# Patient Record
Sex: FEMALE | Race: WHITE | HISPANIC OR LATINO | Employment: FULL TIME | URBAN - METROPOLITAN AREA
[De-identification: names, ages, dates, MRNs, and addresses within clinical notes are randomized per-mention and may not be internally consistent; named-entity substitution may affect disease eponyms.]

---
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
## 2019-05-14 ENCOUNTER — HOSPITAL ENCOUNTER (EMERGENCY)
Facility: HOSPITAL | Age: 19
Discharge: HOME | End: 2019-05-14
Attending: EMERGENCY MEDICINE | Admitting: EMERGENCY MEDICINE

## 2019-05-14 VITALS
HEART RATE: 70 BPM | DIASTOLIC BLOOD PRESSURE: 92 MMHG | OXYGEN SATURATION: 100 % | SYSTOLIC BLOOD PRESSURE: 135 MMHG | RESPIRATION RATE: 16 BRPM | WEIGHT: 229.28 LBS | HEIGHT: 68 IN | TEMPERATURE: 97.6 F | BODY MASS INDEX: 34.75 KG/M2

## 2019-05-14 DIAGNOSIS — R73.9 HYPERGLYCEMIA: ICD-10-CM

## 2019-05-14 DIAGNOSIS — R10.9 ABDOMINAL PAIN: Primary | ICD-10-CM

## 2019-05-14 LAB
ALBUMIN SERPL BCP-MCNC: 4.1 G/DL (ref 3.5–5)
ALP SERPL-CCNC: 173 U/L (ref 46–384)
ALT SERPL W P-5'-P-CCNC: 25 U/L (ref 12–78)
ANION GAP SERPL CALCULATED.3IONS-SCNC: 6 MMOL/L (ref 4–13)
AST SERPL W P-5'-P-CCNC: 9 U/L (ref 5–45)
BASOPHILS # BLD AUTO: 0.05 THOUSANDS/ΜL (ref 0–0.1)
BASOPHILS NFR BLD AUTO: 1 % (ref 0–1)
BILIRUB SERPL-MCNC: 0.5 MG/DL (ref 0.2–1)
BILIRUB UR QL STRIP: NEGATIVE
BUN SERPL-MCNC: 9 MG/DL (ref 5–25)
CALCIUM SERPL-MCNC: 9 MG/DL (ref 8.3–10.1)
CHLORIDE SERPL-SCNC: 104 MMOL/L (ref 100–108)
CLARITY UR: CLEAR
CO2 SERPL-SCNC: 25 MMOL/L (ref 21–32)
COLOR UR: YELLOW
COLOR, POC: ABNORMAL
CREAT SERPL-MCNC: 0.7 MG/DL (ref 0.6–1.3)
EOSINOPHIL # BLD AUTO: 0.15 THOUSAND/ΜL (ref 0–0.61)
EOSINOPHIL NFR BLD AUTO: 3 % (ref 0–6)
ERYTHROCYTE [DISTWIDTH] IN BLOOD BY AUTOMATED COUNT: 13.2 % (ref 11.6–15.1)
EXT PREG TEST URINE: NORMAL
GFR SERPL CREATININE-BSD FRML MDRD: 126 ML/MIN/1.73SQ M
GLUCOSE SERPL-MCNC: 388 MG/DL (ref 65–140)
GLUCOSE SERPL-MCNC: 395 MG/DL (ref 65–140)
GLUCOSE UR STRIP-MCNC: ABNORMAL MG/DL
HCT VFR BLD AUTO: 46.7 % (ref 34.8–46.1)
HGB BLD-MCNC: 15.3 G/DL (ref 11.5–15.4)
HGB UR QL STRIP.AUTO: NEGATIVE
IMM GRANULOCYTES # BLD AUTO: 0.01 THOUSAND/UL (ref 0–0.2)
IMM GRANULOCYTES NFR BLD AUTO: 0 % (ref 0–2)
KETONES UR STRIP-MCNC: ABNORMAL MG/DL
LEUKOCYTE ESTERASE UR QL STRIP: NEGATIVE
LIPASE SERPL-CCNC: 114 U/L (ref 73–393)
LYMPHOCYTES # BLD AUTO: 1.36 THOUSANDS/ΜL (ref 0.6–4.47)
LYMPHOCYTES NFR BLD AUTO: 23 % (ref 14–44)
MCH RBC QN AUTO: 27.9 PG (ref 26.8–34.3)
MCHC RBC AUTO-ENTMCNC: 32.8 G/DL (ref 31.4–37.4)
MCV RBC AUTO: 85 FL (ref 82–98)
MONOCYTES # BLD AUTO: 0.51 THOUSAND/ΜL (ref 0.17–1.22)
MONOCYTES NFR BLD AUTO: 9 % (ref 4–12)
NEUTROPHILS # BLD AUTO: 3.89 THOUSANDS/ΜL (ref 1.85–7.62)
NEUTS SEG NFR BLD AUTO: 64 % (ref 43–75)
NITRITE UR QL STRIP: NEGATIVE
NRBC BLD AUTO-RTO: 0 /100 WBCS
PH UR STRIP.AUTO: 7 [PH] (ref 4.5–8)
PLATELET # BLD AUTO: 263 THOUSANDS/UL (ref 149–390)
PMV BLD AUTO: 11 FL (ref 8.9–12.7)
POTASSIUM SERPL-SCNC: 3.9 MMOL/L (ref 3.5–5.3)
PROT SERPL-MCNC: 8.1 G/DL (ref 6.4–8.2)
PROT UR STRIP-MCNC: NEGATIVE MG/DL
RBC # BLD AUTO: 5.48 MILLION/UL (ref 3.81–5.12)
SODIUM SERPL-SCNC: 135 MMOL/L (ref 136–145)
SP GR UR STRIP.AUTO: 1.01 (ref 1–1.03)
UROBILINOGEN UR QL STRIP.AUTO: 0.2 E.U./DL
WBC # BLD AUTO: 5.97 THOUSAND/UL (ref 4.31–10.16)

## 2019-05-14 PROCEDURE — 96374 THER/PROPH/DIAG INJ IV PUSH: CPT

## 2019-05-14 PROCEDURE — 81025 URINE PREGNANCY TEST: CPT

## 2019-05-14 PROCEDURE — 36415 COLL VENOUS BLD VENIPUNCTURE: CPT | Performed by: EMERGENCY MEDICINE

## 2019-05-14 PROCEDURE — 96375 TX/PRO/DX INJ NEW DRUG ADDON: CPT

## 2019-05-14 PROCEDURE — 82948 REAGENT STRIP/BLOOD GLUCOSE: CPT

## 2019-05-14 PROCEDURE — 99284 EMERGENCY DEPT VISIT MOD MDM: CPT | Performed by: EMERGENCY MEDICINE

## 2019-05-14 PROCEDURE — 80053 COMPREHEN METABOLIC PANEL: CPT | Performed by: EMERGENCY MEDICINE

## 2019-05-14 PROCEDURE — 99284 EMERGENCY DEPT VISIT MOD MDM: CPT

## 2019-05-14 PROCEDURE — 96361 HYDRATE IV INFUSION ADD-ON: CPT

## 2019-05-14 PROCEDURE — 83690 ASSAY OF LIPASE: CPT | Performed by: EMERGENCY MEDICINE

## 2019-05-14 PROCEDURE — 85025 COMPLETE CBC W/AUTO DIFF WBC: CPT | Performed by: EMERGENCY MEDICINE

## 2019-05-14 PROCEDURE — 81003 URINALYSIS AUTO W/O SCOPE: CPT

## 2019-05-14 RX ORDER — DICYCLOMINE HCL 20 MG
20 TABLET ORAL ONCE
Status: COMPLETED | OUTPATIENT
Start: 2019-05-14 | End: 2019-05-14

## 2019-05-14 RX ORDER — ONDANSETRON 2 MG/ML
4 INJECTION INTRAMUSCULAR; INTRAVENOUS ONCE
Status: COMPLETED | OUTPATIENT
Start: 2019-05-14 | End: 2019-05-14

## 2019-05-14 RX ORDER — KETOROLAC TROMETHAMINE 30 MG/ML
15 INJECTION, SOLUTION INTRAMUSCULAR; INTRAVENOUS ONCE
Status: COMPLETED | OUTPATIENT
Start: 2019-05-14 | End: 2019-05-14

## 2019-05-14 RX ORDER — MAGNESIUM HYDROXIDE/ALUMINUM HYDROXICE/SIMETHICONE 120; 1200; 1200 MG/30ML; MG/30ML; MG/30ML
30 SUSPENSION ORAL ONCE
Status: COMPLETED | OUTPATIENT
Start: 2019-05-14 | End: 2019-05-14

## 2019-05-14 RX ORDER — FAMOTIDINE 20 MG/1
20 TABLET, FILM COATED ORAL 2 TIMES DAILY
Qty: 60 TABLET | Refills: 0 | Status: SHIPPED | OUTPATIENT
Start: 2019-05-14 | End: 2020-08-18 | Stop reason: ALTCHOICE

## 2019-05-14 RX ADMIN — FAMOTIDINE 20 MG: 10 INJECTION, SOLUTION INTRAVENOUS at 15:49

## 2019-05-14 RX ADMIN — ONDANSETRON 4 MG: 2 INJECTION INTRAMUSCULAR; INTRAVENOUS at 15:49

## 2019-05-14 RX ADMIN — SODIUM CHLORIDE 2000 ML: 0.9 INJECTION, SOLUTION INTRAVENOUS at 17:01

## 2019-05-14 RX ADMIN — ALUMINUM HYDROXIDE, MAGNESIUM HYDROXIDE, AND SIMETHICONE 30 ML: 200; 200; 20 SUSPENSION ORAL at 15:49

## 2019-05-14 RX ADMIN — DICYCLOMINE HYDROCHLORIDE 20 MG: 20 TABLET ORAL at 17:42

## 2019-05-14 RX ADMIN — KETOROLAC TROMETHAMINE 15 MG: 30 INJECTION, SOLUTION INTRAMUSCULAR at 17:42

## 2019-05-14 RX ADMIN — LIDOCAINE HYDROCHLORIDE 15 ML: 20 SOLUTION ORAL; TOPICAL at 15:49

## 2020-01-06 ENCOUNTER — HOSPITAL ENCOUNTER (EMERGENCY)
Facility: HOSPITAL | Age: 20
Discharge: HOME | End: 2020-01-06
Attending: EMERGENCY MEDICINE

## 2020-01-06 VITALS
HEART RATE: 92 BPM | RESPIRATION RATE: 18 BRPM | OXYGEN SATURATION: 97 % | SYSTOLIC BLOOD PRESSURE: 130 MMHG | DIASTOLIC BLOOD PRESSURE: 79 MMHG | TEMPERATURE: 97 F

## 2020-01-06 DIAGNOSIS — J40 BRONCHITIS: Primary | ICD-10-CM

## 2020-01-06 PROCEDURE — 99282 EMERGENCY DEPT VISIT SF MDM: CPT

## 2020-01-06 PROCEDURE — 99284 EMERGENCY DEPT VISIT MOD MDM: CPT | Performed by: PHYSICIAN ASSISTANT

## 2020-01-06 RX ORDER — AZITHROMYCIN 250 MG/1
TABLET, FILM COATED ORAL
Qty: 6 TABLET | Refills: 0 | Status: SHIPPED | OUTPATIENT
Start: 2020-01-06 | End: 2020-01-10

## 2020-01-06 NOTE — ED PROVIDER NOTES
History  Chief Complaint   Patient presents with    Nasal Congestion     Congestion, runny nose since last week, dry cough  60-year-old female presents to the emergency department with complaints of nasal congestion with a productive cough over the past 1-2 weeks  Questionable fevers at home  States she has had intermittent sweating as well as chills at night  Also states that her boyfriend has been sick with similar symptoms  States that cough has been productive with some green and yellow mucus  States that it is been scattered throughout the day  Does not seem to be worse in the morning or at night  Denies difficulty breathing or shortness of breath  No history of asthma  History provided by:  Patient   used: No        Prior to Admission Medications   Prescriptions Last Dose Informant Patient Reported? Taking?   famotidine (PEPCID) 20 mg tablet   No No   Sig: Take 1 tablet (20 mg total) by mouth 2 (two) times a day for 30 days      Facility-Administered Medications: None       Past Medical History:   Diagnosis Date    Diabetes mellitus ([facility] [address] )        History reviewed  No pertinent surgical history  History reviewed  No pertinent family history  I have reviewed and agree with the history as documented  Social History     Tobacco Use    Smoking status: Current Every Day Smoker     Packs/day: 0 20    Smokeless tobacco: Never Used   Substance Use Topics    Alcohol use: No    Drug use: No        Review of Systems   Constitutional: Positive for chills  Negative for activity change and fever  HENT: Positive for congestion  Negative for dental problem, drooling, ear pain, mouth sores, sinus pressure, sneezing, sore throat, tinnitus and trouble swallowing  Eyes: Negative for pain, discharge and itching  Respiratory: Positive for cough  Negative for chest tightness, shortness of breath and wheezing  Cardiovascular: Negative for chest pain     Skin: Negative for rash    Neurological: Negative for dizziness, light-headedness and headaches  All other systems reviewed and are negative  Physical Exam  Physical Exam   Constitutional: She is oriented to person, place, and time  Vital signs are normal  She appears well-developed and well-nourished  No distress  HENT:   Head: Normocephalic and atraumatic  Right Ear: Hearing, tympanic membrane, external ear and ear canal normal    Left Ear: Hearing, tympanic membrane, external ear and ear canal normal    Nose: Nose normal    Mouth/Throat: Uvula is midline and oropharynx is clear and moist  No oropharyngeal exudate  Eyes: Conjunctivae, EOM and lids are normal    Cardiovascular: Normal rate and regular rhythm  Exam reveals no gallop and no friction rub  No murmur heard  Pulmonary/Chest: Effort normal and breath sounds normal  No respiratory distress  She has no wheezes  She has no rhonchi  She has no rales  She exhibits no tenderness  Musculoskeletal: Normal range of motion  Neurological: She is alert and oriented to person, place, and time  Skin: She is not diaphoretic  Psychiatric: She has a normal mood and affect  Her behavior is normal    Vitals reviewed        Vital Signs  ED Triage Vitals [01/06/20 1117]   Temperature Pulse Respirations Blood Pressure SpO2   (!) 97 °F (36 1 °C) 92 18 130/79 97 %      Temp Source Heart Rate Source Patient Position - Orthostatic VS BP Location FiO2 (%)   Oral Monitor Sitting Right arm --      Pain Score       7           Vitals:    01/06/20 1117   BP: 130/79   Pulse: 92   Patient Position - Orthostatic VS: Sitting         Visual Acuity      ED Medications  Medications - No data to display    Diagnostic Studies  Results Reviewed     None                 No orders to display              Procedures  Procedures         ED Course                               MDM  Number of Diagnoses or Management Options  Bronchitis:   Diagnosis management comments: Differential diagnosis includes but not limited to:  Upper respiratory infection, bronchitis, pneumonia          Disposition  Final diagnoses:   Bronchitis     Time reflects when diagnosis was documented in both MDM as applicable and the Disposition within this note     Time User Action Codes Description Comment    1/6/2020 11:34 AM Sol Collins Add [J21 9] Bronchiolitis     1/6/2020 11:34 AM Sol Collins Add [J40] Bronchitis     1/6/2020 11:34 AM Sol Collins Modify [J40] Bronchitis     1/6/2020 11:34 AM Sol Collins Remove [J21 9] Bronchiolitis       ED Disposition     ED Disposition Condition Date/Time Comment    Discharge Stable Mon Jan 6, 2020 11:34 AM Channing Sierra discharge to home/self care  Follow-up Information    None         Discharge Medication List as of 1/6/2020 11:36 AM      START taking these medications    Details   azithromycin (ZITHROMAX) 250 mg tablet Take 2 tablets today then 1 tablet daily x 4 days, Normal         CONTINUE these medications which have NOT CHANGED    Details   famotidine (PEPCID) 20 mg tablet Take 1 tablet (20 mg total) by mouth 2 (two) times a day for 30 days, Starting Tue 5/14/2019, Until Thu 6/13/2019, Print           No discharge procedures on file      ED Provider  Electronically Signed by           Duc Rousseau PA-C  01/06/20 6504

## 2020-08-18 ENCOUNTER — HOSPITAL ENCOUNTER (EMERGENCY)
Facility: HOSPITAL | Age: 20
Discharge: HOME | End: 2020-08-18
Attending: EMERGENCY MEDICINE | Admitting: EMERGENCY MEDICINE
Payer: COMMERCIAL

## 2020-08-18 VITALS
OXYGEN SATURATION: 96 % | RESPIRATION RATE: 18 BRPM | SYSTOLIC BLOOD PRESSURE: 167 MMHG | HEART RATE: 83 BPM | WEIGHT: 223.33 LBS | BODY MASS INDEX: 33.96 KG/M2 | DIASTOLIC BLOOD PRESSURE: 81 MMHG

## 2020-08-18 DIAGNOSIS — R10.2 PELVIC PAIN: Primary | ICD-10-CM

## 2020-08-18 DIAGNOSIS — N94.6 DYSMENORRHEA: ICD-10-CM

## 2020-08-18 DIAGNOSIS — E11.65 HYPERGLYCEMIA DUE TO TYPE 2 DIABETES MELLITUS (HCC): ICD-10-CM

## 2020-08-18 LAB
BACTERIA UR QL AUTO: ABNORMAL /HPF
BILIRUB UR QL STRIP: NEGATIVE
CLARITY UR: CLEAR
COLOR UR: YELLOW
EXT PREG TEST URINE: NEGATIVE
EXT. CONTROL ED NAV: NORMAL
GLUCOSE SERPL-MCNC: 317 MG/DL (ref 65–140)
GLUCOSE UR STRIP-MCNC: ABNORMAL MG/DL
HGB UR QL STRIP.AUTO: NEGATIVE
KETONES UR STRIP-MCNC: NEGATIVE MG/DL
LEUKOCYTE ESTERASE UR QL STRIP: ABNORMAL
NITRITE UR QL STRIP: NEGATIVE
NON-SQ EPI CELLS URNS QL MICRO: ABNORMAL /HPF
PH UR STRIP.AUTO: 7 [PH] (ref 4.5–8)
PROT UR STRIP-MCNC: NEGATIVE MG/DL
RBC #/AREA URNS AUTO: ABNORMAL /HPF
SP GR UR STRIP.AUTO: 1.02 (ref 1–1.03)
UROBILINOGEN UR QL STRIP.AUTO: 0.2 E.U./DL
WBC #/AREA URNS AUTO: ABNORMAL /HPF

## 2020-08-18 PROCEDURE — 82948 REAGENT STRIP/BLOOD GLUCOSE: CPT

## 2020-08-18 PROCEDURE — 81001 URINALYSIS AUTO W/SCOPE: CPT

## 2020-08-18 PROCEDURE — 99284 EMERGENCY DEPT VISIT MOD MDM: CPT | Performed by: EMERGENCY MEDICINE

## 2020-08-18 PROCEDURE — 99284 EMERGENCY DEPT VISIT MOD MDM: CPT

## 2020-08-18 PROCEDURE — 81025 URINE PREGNANCY TEST: CPT | Performed by: EMERGENCY MEDICINE

## 2020-08-18 RX ORDER — NAPROXEN 500 MG/1
500 TABLET ORAL 2 TIMES DAILY WITH MEALS
Qty: 30 TABLET | Refills: 0 | Status: SHIPPED | OUTPATIENT
Start: 2020-08-18

## 2020-08-18 RX ADMIN — METFORMIN HYDROCHLORIDE 500 MG: 500 TABLET, FILM COATED ORAL at 22:44

## 2020-08-19 NOTE — DISCHARGE INSTRUCTIONS
Your sugar was elevated, please resume taking your Metformin, if you have nausea/vomiting, inability to eat/drink, breathing fast or high fever return to the ER  Otherwise follow up with Family Medicine, Endocrinology and Gyn

## 2020-08-19 NOTE — ED PROVIDER NOTES
History  Chief Complaint   Patient presents with    Abdominal Pain     Pt here c/o adominal cramping since 0630 this morning  Pt states she took motrin at 0630 with no relief  Pt has a hx of PCOS that has been untreated for a while  22 yo female with h/o NIDDM and PCOS presents with intermittent pelvic cramping x 1 day  Reports that she is currently menstruating  Denies f/c/n/v/d  Pain at time of eval resolved  Pt requesting help with establishing care with Gyn  Reports that she has been unable to find a doctors office that will accept her insurance  Also reports that she is out of DM medication, metformin  Denies h/o DKA  Denies vaginal or urinary symptoms  History provided by:  Medical records and patient   used: No    Pelvic Pain   Location:  Pelvis  Quality:  Cramping  Severity:  Moderate  Duration:  1 day  Timing:  Intermittent  Progression:  Improving  Chronicity:  New  Context:  Currently menstruating  Relieved by:  Nothing  Worsened by:  Nothing  Ineffective treatments:  Midol  Associated symptoms: no fever and no vomiting    Risk factors:  PCOS      None       Past Medical History:   Diagnosis Date    Diabetes mellitus ([facility] [address] )     PCOS (polycystic ovarian syndrome)        History reviewed  No pertinent surgical history  History reviewed  No pertinent family history  I have reviewed and agree with the history as documented  E-Cigarette/Vaping    E-Cigarette Use Current Some Day User      E-Cigarette/Vaping Substances    Nicotine Yes      Social History     Tobacco Use    Smoking status: Never Smoker    Smokeless tobacco: Never Used   Substance Use Topics    Alcohol use: Yes     Comment: socially    Drug use: No       Review of Systems   Constitutional: Negative for chills and fever  Gastrointestinal: Negative for vomiting  Genitourinary: Positive for pelvic pain and vaginal bleeding  Negative for dysuria, flank pain, frequency and vaginal discharge  All other systems reviewed and are negative  Physical Exam  Physical Exam  Vitals signs and nursing note reviewed  Exam conducted with a chaperone present  Constitutional:       Appearance: She is well-developed  She is not diaphoretic  HENT:      Head: Normocephalic and atraumatic  Eyes:      Conjunctiva/sclera: Conjunctivae normal    Neck:      Musculoskeletal: Normal range of motion  Cardiovascular:      Rate and Rhythm: Normal rate and regular rhythm  Heart sounds: Normal heart sounds  No murmur  Pulmonary:      Effort: Pulmonary effort is normal  No respiratory distress  Breath sounds: Normal breath sounds  Abdominal:      General: Bowel sounds are normal       Palpations: Abdomen is soft  Tenderness: There is no abdominal tenderness  Genitourinary:     General: Normal vulva  Vagina: Normal       Cervix: Cervical bleeding present  No cervical motion tenderness  Uterus: Normal  Not tender  Adnexa: Right adnexa normal and left adnexa normal         Right: No tenderness  Left: No tenderness  Comments: Pt currently mensutrating   Musculoskeletal: Normal range of motion  General: No deformity  Skin:     General: Skin is warm and dry  Neurological:      Mental Status: She is alert and oriented to person, place, and time  Psychiatric:         Behavior: Behavior normal          Thought Content:  Thought content normal          Judgment: Judgment normal          Vital Signs  ED Triage Vitals [08/18/20 2120]   Temp Pulse Respirations Blood Pressure SpO2   -- 83 18 167/81 96 %      Temp src Heart Rate Source Patient Position - Orthostatic VS BP Location FiO2 (%)   -- Monitor Sitting Right arm --      Pain Score       8           Vitals:    08/18/20 2120   BP: 167/81   Pulse: 83   Patient Position - Orthostatic VS: Sitting         Visual Acuity      ED Medications  Medications   metFORMIN (GLUCOPHAGE) tablet 500 mg (500 mg Oral Given 8/18/20 2244)       Diagnostic Studies  Results Reviewed     Procedure Component Value Units Date/Time    Fingerstick Glucose (POCT) [[id number]]  (Abnormal) Collected:  08/18/20 2243    Lab Status:  Final result Updated:  08/18/20 2245     POC Glucose 317 mg/dl     Urine Microscopic [[id number]]  (Abnormal) Collected:  08/18/20 2245    Lab Status:  Final result Specimen:  Urine, Clean Catch Updated:  08/18/20 2242     RBC, UA None Seen /hpf      WBC, UA 0-1 /hpf      Epithelial Cells None Seen /hpf      Bacteria, UA None Seen /hpf     Urine Macroscopic, POC [[id number]]  (Abnormal) Collected:  08/18/20 2245    Lab Status:  Final result Specimen:  Urine Updated:  08/18/20 2228     Color, UA Yellow     Clarity, UA Clear     pH, UA 7 0     Leukocytes, UA Elevated glucose may cause decreased leukocyte values  See urine microscopic for [facility] result/     Nitrite, UA Negative     Protein, UA Negative mg/dl      Glucose, UA >=1000 (1%) mg/dl      Ketones, UA Negative mg/dl      Urobilinogen, UA 0 2 E U /dl      Bilirubin, UA Negative     Blood, UA Negative     Specific Gravity, UA 1 020    Narrative:       CLINITEK RESULT    POCT pregnancy, urine [[id number]]  (Normal) Resulted:  08/18/20 2222    Lab Status:  Final result Updated:  08/18/20 2222     EXT PREG TEST UR (Ref: Negative) negative     Control valid                 No orders to display              Procedures  Procedures         ED Course  ED Course as of Aug 19 0004   Tue Aug 18, 2020   2225 Negative  PREGNANCY TEST URINE: negative   2252 Pt well appearing, well hydrated, no signs of infection in urine or pelvic exam, belly benign, known diabetic non-compliant with meds 2/2 insurance reasons  Re-prescribed metformin and referred to appropriate specialists      Ketones, UA: Negative                                             MDM  Number of Diagnoses or Management Options  Dysmenorrhea:   Hyperglycemia due to type 2 diabetes mellitus [facility]):   Pelvic pain:   Diagnosis management comments: Pt with reassuring exam, well appearing, no significant TTP or CMT  Pt mostly interested in f/u information  Pt also with glucose in urine, no ketones or other symptoms to suggest DKA  Elevated BS secondary to non-compliance  Pt medications refilled and referred to endocrinology, pcp, and gyn  Amount and/or Complexity of Data Reviewed  Clinical lab tests: ordered and reviewed    Risk of Complications, Morbidity, and/or Mortality  Presenting problems: low  Diagnostic procedures: low  Management options: low          Disposition  Final diagnoses:   Pelvic pain   Dysmenorrhea   Hyperglycemia due to type 2 diabetes mellitus ([facility] [address] )     Time reflects when diagnosis was documented in both MDM as applicable and the Disposition within this note     Time User Action Codes Description Comment    8/18/2020 10:13 PM Mulflur, Sunita Add [R10 2] Pelvic pain     8/18/2020 10:14 PM Mulflur, Verlena Dial Add [N94 6] Dysmenorrhea     8/18/2020 10:31 PM Mulflur, Verlena Dial Add [E11 65] Hyperglycemia due to type 2 diabetes mellitus [facility])       ED Disposition     ED Disposition Condition Date/Time Comment    Discharge Stable Tue Aug 18, 2020 10:14 PM Kimi Santiago discharge to home/self care              Follow-up Information     Follow up With Specialties Details Why Contact Info Additional [facility] Family Medicine Schedule an appointment as soon as possible for a visit   [address] [phone]  [address]  , [place], [place], [place], [address]    [address] [facility] Obstetrics and Gynecology   [address] [facility] [address]  [address] [place] [address] [facility], [address] [place] [address] Obstetrics and Gynecology Schedule an appointment as soon as possible for a visit   [address] [facility] [place] [address]  [phone] [facility], [address] [place], [place], [address] Silvana Aggarwal    [address] [place] [facility] [place] [facility] Schedule an appointment as soon as possible for a visit   [address] [facility] [address] [phone] [address] [facility] [place], [address] [place], [place], [address] [facility]          Discharge Medication List as of 8/18/2020 10:57 PM      START taking these medications    Details   metFORMIN (GLUCOPHAGE) 500 mg tablet Take 1 tablet (500 mg total) by mouth 2 (two) times a day with meals, Starting Tue 8/18/2020, Normal      naproxen (NAPROSYN) 500 mg tablet Take 1 tablet (500 mg total) by mouth 2 (two) times a day with meals, Starting Tue 8/18/2020, Normal           No discharge procedures on file      PDMP Review     None          ED Provider  Electronically Signed by           Kaley Alejandra MD  08/19/20 0010

## 2020-09-08 ENCOUNTER — TELEPHONE (OUTPATIENT)
Dept: OBGYN CLINIC | Facility: CLINIC | Age: 20
End: 2020-09-08

## 2020-09-09 NOTE — TELEPHONE ENCOUNTER
Called number but pt is not available on the number  LM recommended pt contact the office to schedule apt

## 2022-02-04 ENCOUNTER — APPOINTMENT (OUTPATIENT)
Dept: LAB | Facility: CLINIC | Age: 22
End: 2022-02-04
Payer: COMMERCIAL

## 2022-02-04 DIAGNOSIS — E11.00 TYPE 2 DIABETES MELLITUS WITH HYPEROSMOLARITY WITHOUT COMA, UNSPECIFIED WHETHER LONG TERM INSULIN USE (HCC): ICD-10-CM

## 2022-02-04 LAB
ALBUMIN SERPL BCP-MCNC: 3.6 G/DL (ref 3.5–5)
ALP SERPL-CCNC: 85 U/L (ref 46–116)
ALT SERPL W P-5'-P-CCNC: 23 U/L (ref 12–78)
ANION GAP SERPL CALCULATED.3IONS-SCNC: 8 MMOL/L (ref 4–13)
AST SERPL W P-5'-P-CCNC: 12 U/L (ref 5–45)
BILIRUB SERPL-MCNC: 0.43 MG/DL (ref 0.2–1)
BUN SERPL-MCNC: 10 MG/DL (ref 5–25)
CALCIUM SERPL-MCNC: 9.4 MG/DL (ref 8.3–10.1)
CHLORIDE SERPL-SCNC: 101 MMOL/L (ref 100–108)
CHOLEST SERPL-MCNC: 147 MG/DL
CO2 SERPL-SCNC: 28 MMOL/L (ref 21–32)
CREAT SERPL-MCNC: 0.67 MG/DL (ref 0.6–1.3)
EST. AVERAGE GLUCOSE BLD GHB EST-MCNC: 280 MG/DL
GFR SERPL CREATININE-BSD FRML MDRD: 125 ML/MIN/1.73SQ M
GLUCOSE P FAST SERPL-MCNC: 229 MG/DL (ref 65–99)
HBA1C MFR BLD: 11.4 %
HDLC SERPL-MCNC: 55 MG/DL
LDLC SERPL CALC-MCNC: 58 MG/DL (ref 0–100)
NONHDLC SERPL-MCNC: 92 MG/DL
POTASSIUM SERPL-SCNC: 4.4 MMOL/L (ref 3.5–5.3)
PROT SERPL-MCNC: 7.7 G/DL (ref 6.4–8.2)
SODIUM SERPL-SCNC: 137 MMOL/L (ref 136–145)
TRIGL SERPL-MCNC: 169 MG/DL

## 2022-02-04 PROCEDURE — 84681 ASSAY OF C-PEPTIDE: CPT

## 2022-02-04 PROCEDURE — 36415 COLL VENOUS BLD VENIPUNCTURE: CPT

## 2022-02-04 PROCEDURE — 80061 LIPID PANEL: CPT

## 2022-02-04 PROCEDURE — 80053 COMPREHEN METABOLIC PANEL: CPT

## 2022-02-04 PROCEDURE — 83036 HEMOGLOBIN GLYCOSYLATED A1C: CPT

## 2022-02-05 LAB — C PEPTIDE SERPL-MCNC: 2.9 NG/ML (ref 1.1–4.4)

## 2023-01-05 ENCOUNTER — OFFICE VISIT (OUTPATIENT)
Dept: INTERNAL MEDICINE CLINIC | Facility: CLINIC | Age: 23
End: 2023-01-05

## 2023-01-05 VITALS
BODY MASS INDEX: 33.92 KG/M2 | HEIGHT: 68 IN | WEIGHT: 223.8 LBS | HEART RATE: 108 BPM | TEMPERATURE: 98.6 F | OXYGEN SATURATION: 97 % | SYSTOLIC BLOOD PRESSURE: 138 MMHG | DIASTOLIC BLOOD PRESSURE: 80 MMHG

## 2023-01-05 DIAGNOSIS — E11.65 TYPE 2 DIABETES MELLITUS WITH HYPERGLYCEMIA, WITHOUT LONG-TERM CURRENT USE OF INSULIN (HCC): Primary | ICD-10-CM

## 2023-01-05 DIAGNOSIS — F41.1 GAD (GENERALIZED ANXIETY DISORDER): ICD-10-CM

## 2023-01-05 DIAGNOSIS — E28.2 PCOS (POLYCYSTIC OVARIAN SYNDROME): ICD-10-CM

## 2023-01-05 LAB — SL AMB POCT HEMOGLOBIN AIC: 14.9 (ref ?–6.5)

## 2023-01-05 RX ORDER — DESOGESTREL AND ETHINYL ESTRADIOL 0.15-0.03
1 KIT ORAL DAILY
COMMUNITY
Start: 2022-11-18

## 2023-01-05 RX ORDER — FLASH GLUCOSE SENSOR
1 KIT MISCELLANEOUS 3 TIMES DAILY
Qty: 2 EACH | Refills: 3 | Status: SHIPPED | OUTPATIENT
Start: 2023-01-05

## 2023-01-05 RX ORDER — UBIQUINOL 100 MG
CAPSULE ORAL 2 TIMES DAILY
Qty: 90 EACH | Refills: 0 | Status: SHIPPED | OUTPATIENT
Start: 2023-01-05

## 2023-01-05 RX ORDER — FLASH GLUCOSE SCANNING READER
EACH MISCELLANEOUS
Qty: 1 EACH | Refills: 0 | Status: SHIPPED | OUTPATIENT
Start: 2023-01-05

## 2023-01-05 RX ORDER — DESOGESTREL AND ETHINYL ESTRADIOL 0.15-0.03
1 KIT ORAL DAILY
COMMUNITY
Start: 2017-09-23

## 2023-01-05 RX ORDER — CITALOPRAM 10 MG/1
10 TABLET ORAL DAILY
Qty: 30 TABLET | Refills: 0 | Status: SHIPPED | OUTPATIENT
Start: 2023-01-05 | End: 2023-02-04

## 2023-01-05 RX ORDER — UBIQUINOL 100 MG
CAPSULE ORAL 2 TIMES DAILY
Qty: 90 EACH | Refills: 0 | Status: SHIPPED | OUTPATIENT
Start: 2023-01-05 | End: 2023-01-05

## 2023-01-05 NOTE — ASSESSMENT & PLAN NOTE
Patient states that she was diagnosed with PCOS at the age of 14 and was intially managed by an Endocrinologist   Denies any overt Hirsutism   She is currently on [place]   However she reports irregular menses if she misses one dose/day  Repeat labs-LH and [facility]   [facility] referral

## 2023-01-05 NOTE — ASSESSMENT & PLAN NOTE
Patient has a long history of Anxiety and depression though has never seek medical treatment regarding this   Will start patient on Celexa 10 mg daily

## 2023-01-05 NOTE — PROGRESS NOTES
Name: Joel Ferguson      : 2000      MRN: [id number]  Encounter Provider: German Martinez MD  Encounter Date: 2023   Encounter department: [address] [place] [address]  Type 2 diabetes mellitus with hyperglycemia, without long-term current use of insulin [facility])  Assessment & Plan:    Lab Results   Component Value Date    HGBA1C 14 9 (A) 2023   Patient states that she was diagnosed with T2DM at the age of 14  She was initally managed by an Endorcrinologist  She currently takes Metformin 500mg BID, however she states that she stopped taking her medication for awhile  A1c on  was 11 2 hemogolobin today in office is 14 9  We discussed that insulin is needed at this time in the management of her DM  Patient states that she does not want to be on in[place]lin  Patient was in agreement to starting [address]  mg BID  Free style Miguelina   alchol swabs   Microalbumin/creatinine ratio   Follow up in 3 months     Orders:  -     TSH, 3rd generation; Future  -     Continuous Blood Gluc Sensor (FreeStyle Miguelina 14 Day Sensor) MISC; Use 1 application 3 (three) times a day  -     sitaGLIPtin-metFORMIN (JANUMET)  MG per tablet; Take 1 tablet by mouth 2 (two) times a day with meals  -     Continuous Blood Gluc  (FreeStyle Miguelina 14 Day [place]) KAYLYNN; Dispense one reader  -     POCT hemoglobin A1c  -     CBC and differential; Future  -     LDL cholesterol, direct; Future  -     Microalbumin / creatinine urine ratio  -     TSH, 3rd generation  -     CBC and differential  -     LDL cholesterol, direct  -     Alcohol Swabs (Alcohol Prep) 70 % PADS; Use 2 (two) times a day    2   NÉSTOR (generalized anxiety disorder)  Assessment & Plan:  Patient has a long history of Anxiety and depression though has never seek medical treatment regarding this   Will start patient on Celexa 10 mg daily      Orders:  -     citalopram (CeleXA) 10 mg tablet; Take 1 tablet (10 mg total) by mouth daily    3  PCOS (polycystic ovarian syndrome)  Assessment & Plan:  Patient states that she was diagnosed with PCOS at the age of 14 and was intially managed by an Endocrinologist   Denies any overt Hirsutism   She is currently on [place]  However she reports irregular menses if she misses one dose/day  Repeat labs-LH and [facility]   OBGYN referral     Orders:  -     [facility] and ; Future  -     Ambulatory Referral to Obstetrics / Gynecology; Future  -     FSH and LH         Subjective      HPI   20year old female with a history of uncontrolled T2DM and PCOS here to establish care  Patient states that she was Diagnosed with DM at the age of 14 and PCOS at the age of 14  Patient state that she was being managed by an endocrinologist however at the age of 15 she was no longer seeing one  Due to her work schedule, she unfortunately would take her metformin (dosed BID) only a few hours apart instead of am and Pm  which caused her significant nausea and vomiting  She states that she has not been taking her metformin for awhile  She does complaining of irregular menses regarding birth control for her PCOS  She denies any overt Hirsutism  She complains of longer on/off johnson with anxiety/depression  She denies anf SOB,chest pain, diarrhea/cosntipation fever or chills   Review of Systems   Constitutional: Negative for chills and fever  HENT: Negative for ear pain and sore throat  Eyes: Negative for pain and visual disturbance  Respiratory: Negative for cough and shortness of breath  Cardiovascular: Negative for chest pain and palpitations  Gastrointestinal: Negative for abdominal pain and vomiting  Genitourinary: Negative for dysuria and hematuria  Musculoskeletal: Negative for arthralgias and back pain  Skin: Negative for color change and rash  Neurological: Negative for seizures and syncope  All other systems reviewed and are negative        Current Outpatient Medications on File Prior to Visit   Medication Sig   • Enskyce 0 15-30 MG-MCG per tablet Take 1 tablet by mouth daily   • metFORMIN (GLUCOPHAGE) 500 mg tablet Take 1 tablet (500 mg total) by mouth 2 (two) times a day with meals   • desogestrel-ethinyl estradiol (APRI) 0 15-30 MG-MCG per tablet Take 1 tablet by mouth daily (Patient not taking: Reported on 1/5/2023)   • naproxen (NAPROSYN) 500 mg tablet Take 1 tablet (500 mg total) by mouth 2 (two) times a day with meals (Patient not taking: Reported on 1/5/2023)       Objective     /80 (BP Location: Right arm, Patient Position: Sitting, Cuff Size: Standard)   Pulse (!) 108   Temp 98 6 °F (37 °C) (Tympanic)   Ht 5' 8" (1 727 m)   Wt 102 kg (223 lb 12 8 oz)   SpO2 97%   BMI 34 03 kg/m²     Physical Exam  HENT:      Head: Normocephalic and atraumatic  Right Ear: Tympanic membrane normal       Left Ear: Tympanic membrane normal       Nose: Nose normal       Mouth/Throat:      Pharynx: Oropharynx is clear  Cardiovascular:      Rate and Rhythm: Regular rhythm  Tachycardia present  Pulses:           Dorsalis pedis pulses are 2+ on the right side and 2+ on the left side  Heart sounds: Normal heart sounds  Pulmonary:      Effort: Pulmonary effort is normal       Breath sounds: Normal breath sounds  Abdominal:      General: Bowel sounds are normal    Musculoskeletal:      Cervical back: Normal range of motion  Right foot: No deformity, bunion, Charcot foot or foot drop  Left foot: No deformity, bunion, foot drop or prominent metatarsal heads  Feet:      Right foot:      Protective Sensation: 10 sites tested  10 sites sensed  Left foot:      Protective Sensation: 10 sites tested  10 sites sensed  Skin integrity: Skin integrity normal    Skin:     General: Skin is warm  Neurological:      General: No focal deficit present  Mental Status: She is alert and oriented to person, place, and time     Psychiatric: Mood and Affect: Mood normal          Behavior: Behavior normal        Liv Albarran MD   Time spent >45 minutes

## 2023-01-05 NOTE — ASSESSMENT & PLAN NOTE
Lab Results   Component Value Date    HGBA1C 14 9 (A) 01/05/2023   Patient states that she was diagnosed with T2DM at the age of 14  She was initally managed by an Endorcrinologist  She currently takes Metformin 500mg BID, however she states that she stopped taking her medication for awhile  A1c on 2/22 was 11 2 hemogolobin today in office is 14 9  We discussed that insulin is needed at this time in the management of her DM   Patient states that she does not want to be on [place]  Patient was in agreement to starting [address]  mg BID  Free style Miguelina   alchol swabs   Microalbumin/creatinine ratio   Follow up in 3 months

## 2023-01-09 ENCOUNTER — TELEPHONE (OUTPATIENT)
Dept: INTERNAL MEDICINE CLINIC | Facility: CLINIC | Age: 23
End: 2023-01-09

## 2023-01-09 NOTE — TELEPHONE ENCOUNTER
Radha Mayo has called the IM [place] office in regards to her IM appointment on 01/05/2023  Dorothy Julien has stated her insurance would not cover the [employer] 14 day  or sensor  Dorothy Julien has requested the machine be changed to another glucose monitor

## 2023-01-10 NOTE — TELEPHONE ENCOUNTER
Called [facility] to start verbal prior authorization for [employer] 14 day device  Created verbal prior authorization sent to [facility] for approval  Authorization #: [id number]

## 2023-01-13 NOTE — TELEPHONE ENCOUNTER
Called [facility] in regards to [facility] [employer] prior authorization for freestyle cynthia 14 day sensor and reader  [facility] stated the prior authorization was denied  Will attempted again

## 2023-01-17 ENCOUNTER — HOSPITAL ENCOUNTER (EMERGENCY)
Facility: HOSPITAL | Age: 23
Discharge: HOME | End: 2023-01-17
Attending: EMERGENCY MEDICINE

## 2023-01-17 VITALS
SYSTOLIC BLOOD PRESSURE: 132 MMHG | TEMPERATURE: 98.6 F | OXYGEN SATURATION: 99 % | DIASTOLIC BLOOD PRESSURE: 80 MMHG | RESPIRATION RATE: 20 BRPM | HEART RATE: 81 BPM

## 2023-01-17 DIAGNOSIS — S02.5XXA CLOSED FRACTURE OF TOOTH, INITIAL ENCOUNTER: ICD-10-CM

## 2023-01-17 DIAGNOSIS — K06.9 GINGIVAL DISEASE: Primary | ICD-10-CM

## 2023-01-17 RX ORDER — IBUPROFEN 400 MG/1
400 TABLET ORAL ONCE
Status: COMPLETED | OUTPATIENT
Start: 2023-01-17 | End: 2023-01-17

## 2023-01-17 RX ORDER — PENICILLIN V POTASSIUM 250 MG/1
500 TABLET ORAL ONCE
Status: COMPLETED | OUTPATIENT
Start: 2023-01-17 | End: 2023-01-17

## 2023-01-17 RX ORDER — PENICILLIN V POTASSIUM 250 MG/1
500 TABLET ORAL 3 TIMES DAILY
Qty: 42 TABLET | Refills: 0 | Status: SHIPPED | OUTPATIENT
Start: 2023-01-17 | End: 2023-01-24

## 2023-01-17 RX ADMIN — IBUPROFEN 400 MG: 400 TABLET, FILM COATED ORAL at 20:21

## 2023-01-17 RX ADMIN — PENICILLIN V POTASSIUM 500 MG: 250 TABLET, FILM COATED ORAL at 20:21

## 2023-01-18 ENCOUNTER — TELEPHONE (OUTPATIENT)
Dept: INTERNAL MEDICINE CLINIC | Facility: CLINIC | Age: 23
End: 2023-01-18

## 2023-01-18 NOTE — ED PROVIDER NOTES
History  Chief Complaint   Patient presents with   • Dental Pain     Pt presents to the ed after eating dinner and felt pain on the top right side on of her mouth, reports her wisdom tooth may have cracked, insurance told her to come to the ed to get checked out        History provided by:  Patient   used: No    Dental Pain  Location:  Upper  Upper teeth location:  1/RU 3rd molar  Quality:  Sharp  Severity:  Moderate  Onset quality:  Sudden  Duration:  3 hours  Timing:  Constant  Progression:  Unchanged  Chronicity:  New  Context: dental fracture    Relieved by:  Nothing  Worsened by:  Nothing  Ineffective treatments:  None tried  Associated symptoms: gum swelling    Associated symptoms: no difficulty swallowing, no drooling, no facial pain, no fever, no oral bleeding and no oral lesions    Risk factors: lack of dental care    Risk factors: no cancer and no diabetes        Prior to Admission Medications   Prescriptions Last Dose Informant Patient Reported? Taking?    Alcohol Swabs (Alcohol Prep) 70 % PADS   No No   Sig: Use 2 (two) times a day   Continuous Blood Gluc  (FreeStyle Miguelina 14 Day [place]) KAYLYNN   No No   Sig: Dispense one reader   Continuous Blood Gluc Sensor (FreeStyle Miguelina 14 Day Sensor) MISC   No No   Sig: Use 1 application 3 (three) times a day   Enskyce 0 15-30 MG-MCG per tablet   Yes No   Sig: Take 1 tablet by mouth daily   citalopram (CeleXA) 10 mg tablet   No No   Sig: Take 1 tablet (10 mg total) by mouth daily   desogestrel-ethinyl estradiol (APRI) 0 15-30 MG-MCG per tablet   Yes No   Sig: Take 1 tablet by mouth daily   Patient not taking: Reported on 1/5/2023   metFORMIN (GLUCOPHAGE) 500 mg tablet   No No   Sig: Take 1 tablet (500 mg total) by mouth 2 (two) times a day with meals   naproxen (NAPROSYN) 500 mg tablet   No No   Sig: Take 1 tablet (500 mg total) by mouth 2 (two) times a day with meals   Patient not taking: Reported on 1/5/2023   sitaGLIPtin-metFORMIN (JANUMET)  MG per tablet   No No   Sig: Take 1 tablet by mouth 2 (two) times a day with meals      Facility-Administered Medications: None       Past Medical History:   Diagnosis Date   • Diabetes mellitus ([facility] [address] )    • PCOS (polycystic ovarian syndrome)        History reviewed  No pertinent surgical history  Family History   Problem Relation Age of Onset   • Diabetes Mother    • Diabetes Father    • Diabetes Maternal Grandmother    • Diabetes Maternal Grandfather      I have reviewed and agree with the history as documented  E-Cigarette/Vaping   • E-Cigarette Use Never User      E-Cigarette/Vaping Substances   • Nicotine No    • THC No    • CBD No    • Flavoring No    • Other No    • Unknown No      Social History     Tobacco Use   • Smoking status: Never   • Smokeless tobacco: Never   Vaping Use   • Vaping Use: Never used   Substance Use Topics   • Alcohol use: Yes     Comment: socially   • Drug use: Yes     Types: Marijuana       Review of Systems   Constitutional: Negative for fever  HENT: Negative for drooling and mouth sores  All other systems reviewed and are negative  Physical Exam  Physical Exam  Vitals and nursing note reviewed  Constitutional:       General: She is not in acute distress  Appearance: She is well-developed  HENT:      Head: Normocephalic and atraumatic  Mouth/Throat:      Mouth: Mucous membranes are moist  No oral lesions  Dentition: Gingival swelling present  Comments: Fracture versus upper right molar  No exposure of the pulp or dentin  There is some mild gingival swelling but no obvious drainage  Eyes:      Conjunctiva/sclera: Conjunctivae normal    Musculoskeletal:         General: No swelling or tenderness  Neurological:      General: No focal deficit present  Mental Status: She is alert and oriented to person, place, and time  Mental status is at baseline  Sensory: No sensory deficit  Motor: No weakness     Psychiatric: Mood and Affect: Mood normal          Vital Signs  ED Triage Vitals [01/17/23 1930]   Temperature Pulse Respirations Blood Pressure SpO2   98 6 °F (37 °C) 81 20 132/80 99 %      Temp Source Heart Rate Source Patient Position - Orthostatic VS BP Location FiO2 (%)   Oral Monitor Sitting Left arm --      Pain Score       9           Vitals:    01/17/23 1930   BP: 132/80   Pulse: 81   Patient Position - Orthostatic VS: Sitting         Visual Acuity      ED Medications  Medications   ibuprofen (MOTRIN) tablet 400 mg (has no administration in time range)   penicillin V potassium (VEETID) tablet 500 mg (has no administration in time range)       Diagnostic Studies  Results Reviewed     None                 No orders to display              Procedures  Procedures         ED Course                                             Medical Decision Making  This is a 48-year-old healthy female who is presenting with signs and symptoms of a dental fracture  She also has some redness and swelling of the gums that could be a sign of early gingival/periodontal disease  She currently does not have a dentist and will be provided the number for dental follow-up  We will also prescribe her some oral antibiotics as well as anti-inflammatories for pain management  She has no signs of a soft palate tonsillar or uvular infection  She is able to handle her secretions and is not drooling or having any trismus to suggest is of a retropharyngeal or peritonsillar abscess  Closed fracture of tooth, initial encounter: acute illness or injury  Gingival disease: acute illness or injury  Risk  Prescription drug management            Disposition  Final diagnoses:   Gingival disease   Closed fracture of tooth, initial encounter     Time reflects when diagnosis was documented in both MDM as applicable and the Disposition within this note     Time User Action Codes Description Comment    1/17/2023  8:09 PM Zoltan Onofre Add [K06 9] Gingival disease     1/17/2023  8:09 PM Sharmila Hagabby Add [S02  5XXA] Closed fracture of tooth, initial encounter       ED Disposition     ED Disposition   Discharge    Condition   Stable    Date/Time   Tue Jan 17, 2023  8:09 PM    Comment   Ene Jane discharge to home/self care  Follow-up Information     Follow up With Specialties Details Why Contact Info Additional [address] Dentistry Call in 1 day  [facility] [address] [facility], [address] [place] [address], [id number], [phone]          Patient's Medications   Discharge Prescriptions    PENICILLIN V POTASSIUM (VEETID) 250 MG TABLET    Take 2 tablets (500 mg total) by mouth 3 (three) times a day for 7 days       Start Date: 1/17/2023 End Date: 1/24/2023       Order Dose: 500 mg       Quantity: 42 tablet    Refills: 0       No discharge procedures on file      PDMP Review     None          ED Provider  Electronically Signed by           Haja Goodson MD  01/17/23 2026

## 2023-01-18 NOTE — ED NOTES
Discharge instructions reviewed with pt  Pt verbalized understanding  And has no further questions at this time  Pt ambulatory off unit with steady gait        Debra Manzo RN  01/17/23 2023

## 2023-01-30 DIAGNOSIS — F41.1 GAD (GENERALIZED ANXIETY DISORDER): ICD-10-CM

## 2023-01-30 NOTE — TELEPHONE ENCOUNTER
I do not see in the last note guidance for follow up  This refill for citalopriam was given for 30 days should patient have a follow up and refill med if appropriate   Thank you

## 2023-01-31 RX ORDER — CITALOPRAM 10 MG/1
TABLET ORAL
Qty: 30 TABLET | Refills: 0 | Status: SHIPPED | OUTPATIENT
Start: 2023-01-31

## 2023-01-31 NOTE — TELEPHONE ENCOUNTER
I will refill for 30 more days, but would like to see the patient earlier next time Dr Farhad Hylton is in the office, please call patient to schedule an do  Thank you!

## 2023-02-01 ENCOUNTER — TELEPHONE (OUTPATIENT)
Dept: INTERNAL MEDICINE CLINIC | Facility: CLINIC | Age: 23
End: 2023-02-01

## 2023-02-01 NOTE — TELEPHONE ENCOUNTER
I spoke to this patient regarding the next step in the process to try to get the [place] 2 CGM covered by insurance  I called and spoke to the insurance and they are still running under guidelines that require patients to be using insulin as one of the guidelines which the patient does not meet this guideline   She will proceed with the insurance and continue grievance process  She will contact our office if she needs our assitance

## 2023-02-14 PROBLEM — L30.9 ECZEMA: Status: ACTIVE | Noted: 2017-05-16

## 2023-02-14 PROBLEM — E55.9 VITAMIN D DEFICIENCY: Status: ACTIVE | Noted: 2017-05-21

## 2023-03-10 DIAGNOSIS — F41.1 GAD (GENERALIZED ANXIETY DISORDER): ICD-10-CM

## 2023-03-10 RX ORDER — CITALOPRAM 10 MG/1
TABLET ORAL
Qty: 30 TABLET | Refills: 0 | Status: SHIPPED | OUTPATIENT
Start: 2023-03-10

## 2023-04-24 DIAGNOSIS — E28.2 PCOS (POLYCYSTIC OVARIAN SYNDROME): ICD-10-CM

## 2023-04-24 RX ORDER — DESOGESTREL AND ETHINYL ESTRADIOL 0.15-0.03
1 KIT ORAL DAILY
Qty: 28 TABLET | Refills: 2 | Status: SHIPPED | OUTPATIENT
Start: 2023-04-24 | End: 2023-05-22 | Stop reason: SDUPTHER

## 2023-05-01 ENCOUNTER — APPOINTMENT (OUTPATIENT)
Dept: LAB | Facility: CLINIC | Age: 23
End: 2023-05-01

## 2023-05-01 DIAGNOSIS — E11.65 TYPE 2 DIABETES MELLITUS WITH HYPERGLYCEMIA, WITHOUT LONG-TERM CURRENT USE OF INSULIN (HCC): Primary | ICD-10-CM

## 2023-05-01 DIAGNOSIS — E28.2 PCOS (POLYCYSTIC OVARIAN SYNDROME): ICD-10-CM

## 2023-05-01 LAB
BASOPHILS # BLD AUTO: 0.08 THOUSANDS/ΜL (ref 0–0.1)
BASOPHILS NFR BLD AUTO: 1 % (ref 0–1)
CHOLEST SERPL-MCNC: 153 MG/DL
CREAT UR-MCNC: 133 MG/DL
EOSINOPHIL # BLD AUTO: 0.09 THOUSAND/ΜL (ref 0–0.61)
EOSINOPHIL NFR BLD AUTO: 1 % (ref 0–6)
ERYTHROCYTE [DISTWIDTH] IN BLOOD BY AUTOMATED COUNT: 12.8 % (ref 11.6–15.1)
FSH SERPL-ACNC: 3 MIU/ML
HCT VFR BLD AUTO: 42.2 % (ref 34.8–46.1)
HDLC SERPL-MCNC: 59 MG/DL
HGB BLD-MCNC: 13.6 G/DL (ref 11.5–15.4)
IMM GRANULOCYTES # BLD AUTO: 0.04 THOUSAND/UL (ref 0–0.2)
IMM GRANULOCYTES NFR BLD AUTO: 0 % (ref 0–2)
LDLC SERPL CALC-MCNC: 67 MG/DL (ref 0–100)
LH SERPL-ACNC: 4.1 MIU/ML
LYMPHOCYTES # BLD AUTO: 2.83 THOUSANDS/ΜL (ref 0.6–4.47)
LYMPHOCYTES NFR BLD AUTO: 27 % (ref 14–44)
MCH RBC QN AUTO: 28.5 PG (ref 26.8–34.3)
MCHC RBC AUTO-ENTMCNC: 32.2 G/DL (ref 31.4–37.4)
MCV RBC AUTO: 89 FL (ref 82–98)
MICROALBUMIN UR-MCNC: 8.3 MG/L (ref 0–20)
MICROALBUMIN/CREAT 24H UR: 6 MG/G CREATININE (ref 0–30)
MONOCYTES # BLD AUTO: 0.78 THOUSAND/ΜL (ref 0.17–1.22)
MONOCYTES NFR BLD AUTO: 7 % (ref 4–12)
NEUTROPHILS # BLD AUTO: 6.69 THOUSANDS/ΜL (ref 1.85–7.62)
NEUTS SEG NFR BLD AUTO: 64 % (ref 43–75)
NRBC BLD AUTO-RTO: 0 /100 WBCS
PLATELET # BLD AUTO: 290 THOUSANDS/UL (ref 149–390)
PMV BLD AUTO: 10 FL (ref 8.9–12.7)
RBC # BLD AUTO: 4.77 MILLION/UL (ref 3.81–5.12)
TRIGL SERPL-MCNC: 136 MG/DL
TSH SERPL DL<=0.05 MIU/L-ACNC: 3.04 UIU/ML (ref 0.45–4.5)
WBC # BLD AUTO: 10.51 THOUSAND/UL (ref 4.31–10.16)

## 2023-05-02 ENCOUNTER — OFFICE VISIT (OUTPATIENT)
Dept: INTERNAL MEDICINE CLINIC | Facility: CLINIC | Age: 23
End: 2023-05-02

## 2023-05-02 VITALS
WEIGHT: 228 LBS | DIASTOLIC BLOOD PRESSURE: 80 MMHG | TEMPERATURE: 97.6 F | SYSTOLIC BLOOD PRESSURE: 130 MMHG | RESPIRATION RATE: 20 BRPM | BODY MASS INDEX: 35.71 KG/M2 | HEART RATE: 88 BPM | OXYGEN SATURATION: 98 %

## 2023-05-02 DIAGNOSIS — R23.4 CRACKED SKIN: ICD-10-CM

## 2023-05-02 DIAGNOSIS — E11.65 TYPE 2 DIABETES MELLITUS WITH HYPERGLYCEMIA, WITHOUT LONG-TERM CURRENT USE OF INSULIN (HCC): Primary | ICD-10-CM

## 2023-05-02 DIAGNOSIS — E28.2 PCOS (POLYCYSTIC OVARIAN SYNDROME): ICD-10-CM

## 2023-05-02 DIAGNOSIS — E55.9 VITAMIN D DEFICIENCY: ICD-10-CM

## 2023-05-02 LAB — SL AMB POCT HEMOGLOBIN AIC: 11.5 (ref ?–6.5)

## 2023-05-02 RX ORDER — TRIAMCINOLONE ACETONIDE 1 MG/G
CREAM TOPICAL 2 TIMES DAILY
Qty: 30 G | Refills: 0 | Status: CANCELLED | OUTPATIENT
Start: 2023-05-02

## 2023-05-02 RX ORDER — DULAGLUTIDE 0.75 MG/.5ML
0.75 INJECTION, SOLUTION SUBCUTANEOUS
Qty: 6 ML | Refills: 1 | Status: SHIPPED | OUTPATIENT
Start: 2023-05-02

## 2023-05-02 RX ORDER — LANCETS 33 GAUGE
1 EACH MISCELLANEOUS 3 TIMES DAILY
Qty: 100 EACH | Refills: 5 | Status: SHIPPED | OUTPATIENT
Start: 2023-05-02

## 2023-05-02 RX ORDER — BLOOD SUGAR DIAGNOSTIC
STRIP MISCELLANEOUS
Qty: 100 STRIP | Refills: 5 | Status: SHIPPED | OUTPATIENT
Start: 2023-05-02

## 2023-05-02 NOTE — ASSESSMENT & PLAN NOTE
Pt notes cracked skin on fingers    Advised to use moisturizer  Thyroid panel normal  Try to find etiology and avoid

## 2023-05-02 NOTE — ASSESSMENT & PLAN NOTE
Original DX age 14  Sexually active female on OCPs for contraception  Issue is being managed by her OBGYN  Pt notes regular periods while on OBC   PLAN  Continue to manage patients DMII

## 2023-05-02 NOTE — ASSESSMENT & PLAN NOTE
Lab Results   Component Value Date    HGBA1C 14 9 (A) 01/05/2023     Pt currently on Janumet 50-1000MG Max dose  Current SXS: None  Current Blood glucose moniotring: None  Current medication complience: Pt noted that since changing to janumet they have been able to be compliant with medication  Pt was consulted on how to use glucose monitoring device  PLAN:  POC HgbA1c in office was 11  Ordered Glucometeor and lancet strips   Dietitian referral   Lifestyle modification as noted in lifestyle section  ADD trulicity 1 46FO sub q weekly   Pt asked to take glucose readings 3 x a day and keep log  Morning before eating and before other meals  Pt told to come back in 2 weeks  As we are unsure of exact etiology ordered diabetic antibody labs to verify if this is type one or two

## 2023-05-02 NOTE — PROGRESS NOTES
Name: Donn Beltran      : 2000      MRN: [id number]  Encounter Provider: Keo Moncada MD  Encounter Date: 2023   Encounter department: [facility] INTERNAL MEDICINE [place]    Assessment & Plan     1  Type 2 diabetes mellitus with hyperglycemia, without long-term current use of insulin ([facility])  Assessment & Plan:    Lab Results   Component Value Date    HGBA1C 14 9 (A) 2023     Pt currently on Janumet 50-1000MG Max dose  Current SXS: None  Current Blood glucose moniotring: None  Current medication complience: Pt noted that since changing to janumet they have been able to be compliant with medication  Pt was consulted on how to use glucose monitoring device  PLAN:  POC HgbA1c in office was 11  Ordered Glucometeor and lancet strips   Dietitian referral   Lifestyle modification as noted in lifestyle section  ADD trulicity 8 48FZ sub q weekly   Pt asked to take glucose readings 3 x a day and keep log  Morning before eating and before other meals  Pt told to come back in 2 weeks  As we are unsure of exact etiology ordered diabetic antibody labs to verify if this is type one or two  Orders:  -     POCT hemoglobin A1c  -     dulaglutide (Trulicity) 0 66 NC/3 1KY injection; Inject 0 5 mL (0 75 mg total) under the skin every 7 days  -     Glucometer  -     Lancets (OneTouch Delica Plus [id number]) MISC; Use 1 each 3 (three) times a day  -     glucose blood (OneTouch Verio) test strip; Use as instructed  -     Islet Cell Antibody Screen With Reflex To Titer; Future  -     Insulin antibody; Future  -     Glutamic acid decarboxylase; Future  -     C-peptide; Future  -     Ambulatory Referral to Nutrition Services; Future  -     Islet Cell Antibody Screen With Reflex To Titer  -     Insulin antibody  -     Glutamic acid decarboxylase  -     C-peptide    2   PCOS (polycystic ovarian syndrome)  Assessment & Plan:  Original DX age 14  Sexually active female on OCPs for contraception  Issue is being managed by her OBGYN  Pt notes regular periods while on OBC   PLAN  Continue to manage patients DMII               3  Vitamin D deficiency  Assessment & Plan:  Pt last Vit D checked in 2017    PLAN:  Repeat Labs prior to next visit  Orders:  -     Vitamin D 25 hydroxy; Future  -     Vitamin D 25 hydroxy    4  Cracked skin  Assessment & Plan:  Pt notes cracked skin on fingers    Advised to use moisturizer  Thyroid panel normal  Try to find etiology and avoid           Subjective      Pt presented to the office for followup  Pt notes compliance with diabetic medication  Pt notes they have not been able to get continuous glucose monitor due to insurance and has not been monitoring glucose  Pt does want basic glucose monitoring device to keep accurate tracking of glucose levels  Pt notes no new symptoms besides some cracked skin on hands  Review of Systems   Constitutional: Negative  Negative for unexpected weight change  HENT: Negative  Eyes: Negative  Respiratory: Negative  Cardiovascular: Negative  Gastrointestinal: Negative  Endocrine: Positive for polydipsia  Genitourinary: Positive for frequency and urgency  Musculoskeletal: Negative  Skin: Negative  Allergic/Immunologic: Positive for environmental allergies  Neurological: Negative          Current Outpatient Medications on File Prior to Visit   Medication Sig    citalopram (CeleXA) 10 mg tablet take 1 tablet by mouth once daily    clobetasol (TEMOVATE) 0 05 % ointment Apply topically 2 (two) times a day for 7 days    Enskyce 0 15-30 MG-MCG per tablet Take 1 tablet by mouth daily    Janumet  MG per tablet take 1 tablet by mouth twice a day with MEALS    Alcohol Swabs (Alcohol Prep) 70 % PADS Use 2 (two) times a day (Patient not taking: Reported on 4/6/2023)    Continuous Blood Gluc  (FreeStyle Miguelina 14 Day [place]) June Oscar Dispensmarshal one reader (Patient not taking: Reported on 4/6/2023)    Continuous Blood Gluc Sensor (FreeStyle Miguelina 14 Day Sensor) MISC Use 1 application 3 (three) times a day (Patient not taking: Reported on 4/6/2023)    desogestrel-ethinyl estradiol (APRI) 0 15-30 MG-MCG per tablet Take 1 tablet by mouth daily    [DISCONTINUED] naproxen (NAPROSYN) 500 mg tablet Take 1 tablet (500 mg total) by mouth 2 (two) times a day with meals (Patient not taking: Reported on 1/5/2023)       Objective     /80 (BP Location: Right arm, Patient Position: Sitting, Cuff Size: Large)   Pulse 88   Temp 97 6 °F (36 4 °C)   Resp 20   Wt 103 kg (228 lb)   SpO2 98%   BMI 35 71 kg/m²     Physical Exam  Vitals and nursing note reviewed  Constitutional:       Appearance: Normal appearance  She is obese  HENT:      Head: Normocephalic and atraumatic  Nose: Nose normal  No congestion or rhinorrhea  Mouth/Throat:      Mouth: Mucous membranes are dry  Pharynx: No oropharyngeal exudate  Eyes:      General: No scleral icterus  Right eye: No discharge  Left eye: No discharge  Conjunctiva/sclera: Conjunctivae normal    Cardiovascular:      Rate and Rhythm: Normal rate  Pulses: Normal pulses  Pulmonary:      Effort: Pulmonary effort is normal       Breath sounds: Normal breath sounds  Abdominal:      General: There is no distension  Tenderness: There is no abdominal tenderness  There is no guarding  Musculoskeletal:      Cervical back: Neck supple  No rigidity or tenderness  Right lower leg: No edema  Left lower leg: No edema  Lymphadenopathy:      Cervical: No cervical adenopathy  Skin:     Capillary Refill: Capillary refill takes less than 2 seconds  Coloration: Skin is not jaundiced  Neurological:      Mental Status: She is alert and oriented to person, place, and time     Psychiatric:         Behavior: Behavior normal         Nutrition Assessment and Intervention:     Recommended completion of food recall journal Ordered nutritional assessment labs    New Nutrition Prescription completed with patient    Referral given to nutritionist or nutrition shared medical appointment    Recipes and/or cooking website resources provided to patient    Online resources such as NutritionFacts  Maurene Beady  com, plantstrong com, [employer], or similar provided to patient      Other interventions: Pt drinks water and water with sweetener  Pt advised to drink only water  Pt eats 8:00 salad or lunchable  Pt eats 17:00 rice corn chx pork   Snacks beef jerky  Peanuts  Grapes  Coffee with drop of cream     PLAN: Stop drink calories  Snacks limited to fruit        Physical Activity Assessment and Intervention:    Activity journal completion recommended    Physical Activity Prescription completed with patient      Other interventions: Pt encouraged to do 150-200 mins of physical activity per week  Encouraged to do with partner  Emotional and Mental Well-being, Sleep, Connectedness Assessment and Intervention:    Sleep/stress assessment performed    Depression and anxiety screening performed and reviewed    Counseled regarding sleep hygiene and aspects of healthy sleep    Mindfulness program recommended/explained    [address] referral given    Stress management, meditation, yoga, or sleep online resources/apps provided to patient      Other interventions: Sleeps well      Tobacco and Toxic Substance Assessment and Intervention:     Tobacco use screening performed    Alcohol and drug use screening performed    Brief intervention performed for tobacco, alcohol, or drug use      Other interventions: Pt smokes marijuana 2 to 3 times a day  Blunt or bowl      Alcohol ondrink on outings most 2 drinks      Raleigh Tolentino MD

## 2023-05-22 DIAGNOSIS — E11.65 TYPE 2 DIABETES MELLITUS WITH HYPERGLYCEMIA, WITHOUT LONG-TERM CURRENT USE OF INSULIN (HCC): Primary | ICD-10-CM

## 2023-05-22 DIAGNOSIS — E28.2 PCOS (POLYCYSTIC OVARIAN SYNDROME): ICD-10-CM

## 2023-05-22 RX ORDER — DESOGESTREL AND ETHINYL ESTRADIOL 0.15-0.03
1 KIT ORAL DAILY
Qty: 28 TABLET | Refills: 2 | Status: SHIPPED | OUTPATIENT
Start: 2023-05-22

## 2023-05-22 RX ORDER — BLOOD-GLUCOSE METER
1 KIT MISCELLANEOUS 3 TIMES DAILY
Qty: 1 KIT | Refills: 0 | Status: SHIPPED | OUTPATIENT
Start: 2023-05-22

## 2023-05-22 NOTE — TELEPHONE ENCOUNTER
Left message informing patient her requested med and glucometer have been refilled and are at the pharmacy

## 2023-05-23 ENCOUNTER — OFFICE VISIT (OUTPATIENT)
Dept: INTERNAL MEDICINE CLINIC | Facility: CLINIC | Age: 23
End: 2023-05-23

## 2023-05-23 VITALS
DIASTOLIC BLOOD PRESSURE: 90 MMHG | HEART RATE: 105 BPM | WEIGHT: 226.8 LBS | SYSTOLIC BLOOD PRESSURE: 120 MMHG | BODY MASS INDEX: 34.37 KG/M2 | OXYGEN SATURATION: 98 % | TEMPERATURE: 97.7 F | HEIGHT: 68 IN

## 2023-05-23 DIAGNOSIS — E11.65 TYPE 2 DIABETES MELLITUS WITH HYPERGLYCEMIA, WITHOUT LONG-TERM CURRENT USE OF INSULIN (HCC): ICD-10-CM

## 2023-05-23 DIAGNOSIS — L30.9 ECZEMA, UNSPECIFIED TYPE: Primary | ICD-10-CM

## 2023-05-23 RX ORDER — TRIAMCINOLONE ACETONIDE 1 MG/G
CREAM TOPICAL 2 TIMES DAILY
Qty: 30 G | Refills: 0 | Status: SHIPPED | OUTPATIENT
Start: 2023-05-23

## 2023-05-23 RX ORDER — BLOOD SUGAR DIAGNOSTIC
STRIP MISCELLANEOUS
Qty: 100 STRIP | Refills: 5 | Status: SHIPPED | OUTPATIENT
Start: 2023-05-23

## 2023-05-23 NOTE — PROGRESS NOTES
Name: Jaimee Jiménez      : 2000      MRN: [id number]  Encounter Provider: Juan Montalvo MD  Encounter Date: 2023   Encounter department: [address] [place] [address]  Eczema, unspecified type  Assessment & Plan:  · Reports a history since childhood  · Sates that as teenager she would experience a rash involving her neck and chest  · Reports that she does avoid lotions, harsh soap, perfumes and scented creams  · Reports that CeraVe has been working well  · Reports that a friend gave sample of triamcinolone, states that this also worked well  Plan  · We discussed that she will continue to avoid irritants such as perfumes, lotion, scented soaps, alcohol based hand  etc    · Continue with non scented creams  · Triamcinolone ointment 2 times a day  Discussed that she should only use this for acute flares and not daily  Discussed side effects include Skin Atrophy  Orders:  -     triamcinolone (KENALOG) 0 1 % cream; Apply topically 2 (two) times a day    2  Type 2 diabetes mellitus with hyperglycemia, without long-term current use of insulin [facility])  Assessment & Plan:    Lab Results   Component Value Date    HGBA1C 11 5 (A) 2023   · Reports that she was not able to have her blood work done   Will complete it this weekend  · Reports that when she went to the Pharmacy to  her medications,  a glucometer was not sent in, thus she has not been checking her sugar   · Reports that she is experiencing some nausea since starting Trulicity  Plan  Continue with Trulicity 1 06 mg every 7 days   Check BG 3 times a weeks and a keep a log  Follow in 4 weeks  Contact office immediately if not able to get glucometer from Pharmacy   Repeat A1c in August            Subjective      \[facility]\""   Raulshayan Regino is 20year old female with a pertinent pmhx of DM, Eczema and PCOS, who presents for a follow up   She states that she was not able to get the Glucometer at her pharmacy thus she has not been checking her blood sugars  She states that the only side effect she has been experiencing with Trulicity is nausea  She states that the nausea is relieved by smelling alcohol pads  She reports bilateral rash involving her knuckles and over her PIP joints  She states that as a child she would often experience a rash involving her neck, chest and in between her breast   Review of Systems   Constitutional: Negative for chills and fever  HENT: Negative for ear pain and sore throat  Eyes: Negative for pain and visual disturbance  Respiratory: Negative for cough and shortness of breath  Cardiovascular: Negative for chest pain and palpitations  Gastrointestinal: Negative for abdominal distention, abdominal pain and vomiting  Genitourinary: Negative for difficulty urinating, dysuria and hematuria  Musculoskeletal: Negative for arthralgias and back pain  Skin: Negative for color change and rash  Neurological: Negative for seizures, syncope and weakness  Psychiatric/Behavioral: Negative for agitation and behavioral problems  All other systems reviewed and are negative        Current Outpatient Medications on File Prior to Visit   Medication Sig   • Alcohol Swabs (Alcohol Prep) 70 % PADS Use 2 (two) times a day   • Blood Glucose Monitoring Suppl (OneTouch Verio Reflect) w/Device KIT Use 1 Device 3 (three) times a day   • citalopram (CeleXA) 10 mg tablet take 1 tablet by mouth once daily   • clobetasol (TEMOVATE) 0 05 % ointment Apply topically 2 (two) times a day for 7 days   • desogestrel-ethinyl estradiol (APRI) 0 15-30 MG-MCG per tablet Take 1 tablet by mouth daily   • dulaglutide (Trulicity) 0 49 QK/9 2ZO injection Inject 0 5 mL (0 75 mg total) under the skin every 7 days   • Enskyce 0 15-30 MG-MCG per tablet Take 1 tablet by mouth daily   • glucose blood (OneTouch Verio) test strip Use as instructed   • Janumet  MG per tablet take 1 tablet "by mouth twice a day with MEALS   • Lancets (OneTouch Delica Plus [id number]) MISC Use 1 each 3 (three) times a day   • Continuous Blood Gluc  (FreeStyle [place] 14 Day [place]) Ava Adams one reader (Patient not taking: Reported on 4/6/2023)   • Continuous Blood Gluc Sensor (FreeStyle Miguelina 14 Day Sensor) MISC Use 1 application 3 (three) times a day (Patient not taking: Reported on 4/6/2023)       Objective     /90 (BP Location: Left arm, Patient Position: Sitting, Cuff Size: Large)   Pulse 105   Temp 97 7 °F (36 5 °C) (Tympanic)   Ht 5' 7 5\" (1 715 m)   Wt 103 kg (226 lb 12 8 oz)   SpO2 98%   BMI 35 00 kg/m²     Physical Exam  Constitutional:       Appearance: She is obese  HENT:      Head: Normocephalic and atraumatic  Right Ear: Tympanic membrane normal       Left Ear: Tympanic membrane normal       Mouth/Throat:      Pharynx: Oropharynx is clear  Eyes:      Conjunctiva/sclera: Conjunctivae normal    Cardiovascular:      Rate and Rhythm: Normal rate and regular rhythm  Pulses: Normal pulses  Heart sounds: Normal heart sounds  Pulmonary:      Effort: Pulmonary effort is normal       Breath sounds: Normal breath sounds  Abdominal:      General: There is distension  Palpations: Abdomen is soft  Musculoskeletal:      Right lower leg: No edema  Left lower leg: No edema  Skin:     General: Skin is warm  Neurological:      General: No focal deficit present  Mental Status: She is alert and oriented to person, place, and time     Psychiatric:         Mood and Affect: Mood normal          Behavior: Behavior normal        Cristopher Hernandez MD  "

## 2023-05-23 NOTE — ASSESSMENT & PLAN NOTE
· Reports a history since childhood  · Sates that as teenager she would experience a rash involving her neck and chest  · Reports that she does avoid lotions, harsh soap, perfumes and scented creams  · Reports that CeraVe has been working well  · Reports that a friend gave sample of triamcinolone, states that this also worked well  Plan  · We discussed that she will continue to avoid irritants such as perfumes, lotion, scented soaps, alcohol based hand  etc    · Continue with non scented creams  · Triamcinolone ointment 2 times a day  Discussed that she should only use this for acute flares and not daily  Discussed side effects include Skin Atrophy

## 2023-05-23 NOTE — ASSESSMENT & PLAN NOTE
Lab Results   Component Value Date    HGBA1C 11 5 (A) 05/02/2023   · Reports that she was not able to have her blood work done   Will complete it this weekend  · Reports that when she went to the Pharmacy to  her medications,  a glucometer was not sent in, thus she has not been checking her sugar   · Reports that she is experiencing some nausea since starting Trulicity       Plan  Continue with Trulicity 3 53 mg every 7 days   Check BG 3 times a weeks and a keep a log  Follow in 4 weeks  Contact office immediately if not able to get glucometer from Pharmacy   Repeat A1c in August

## 2023-06-30 ENCOUNTER — VBI (OUTPATIENT)
Dept: ADMINISTRATIVE | Facility: OTHER | Age: 23
End: 2023-06-30

## 2023-07-07 ENCOUNTER — VBI (OUTPATIENT)
Dept: ADMINISTRATIVE | Facility: OTHER | Age: 23
End: 2023-07-07

## 2023-07-16 DIAGNOSIS — F41.1 GAD (GENERALIZED ANXIETY DISORDER): ICD-10-CM

## 2023-07-16 DIAGNOSIS — E11.65 TYPE 2 DIABETES MELLITUS WITH HYPERGLYCEMIA, WITHOUT LONG-TERM CURRENT USE OF INSULIN (HCC): ICD-10-CM

## 2023-07-17 RX ORDER — CITALOPRAM HYDROBROMIDE 10 MG/1
TABLET ORAL
Qty: 30 TABLET | Refills: 0 | Status: SHIPPED | OUTPATIENT
Start: 2023-07-17 | End: 2023-08-14

## 2023-07-17 RX ORDER — SITAGLIPTIN AND METFORMIN HYDROCHLORIDE 1000; 50 MG/1; MG/1
TABLET, FILM COATED ORAL
Qty: 60 TABLET | Refills: 2 | Status: SHIPPED | OUTPATIENT
Start: 2023-07-17

## 2023-08-11 DIAGNOSIS — F41.1 GAD (GENERALIZED ANXIETY DISORDER): ICD-10-CM

## 2023-08-11 DIAGNOSIS — E28.2 PCOS (POLYCYSTIC OVARIAN SYNDROME): ICD-10-CM

## 2023-08-14 RX ORDER — CITALOPRAM HYDROBROMIDE 10 MG/1
TABLET ORAL
Qty: 30 TABLET | Refills: 0 | Status: SHIPPED | OUTPATIENT
Start: 2023-08-14

## 2023-08-14 RX ORDER — DESOGESTREL AND ETHINYL ESTRADIOL 0.15-0.03
1 KIT ORAL DAILY
Qty: 28 TABLET | Refills: 2 | Status: SHIPPED | OUTPATIENT
Start: 2023-08-14

## 2023-09-15 DIAGNOSIS — E28.2 PCOS (POLYCYSTIC OVARIAN SYNDROME): ICD-10-CM

## 2023-09-15 RX ORDER — DESOGESTREL AND ETHINYL ESTRADIOL 0.15-0.03
1 KIT ORAL DAILY
Qty: 28 TABLET | Refills: 2 | OUTPATIENT
Start: 2023-09-15

## 2023-09-17 ENCOUNTER — HOSPITAL ENCOUNTER (EMERGENCY)
Facility: HOSPITAL | Age: 23
Discharge: HOME | End: 2023-09-17
Attending: EMERGENCY MEDICINE | Admitting: EMERGENCY MEDICINE
Payer: COMMERCIAL

## 2023-09-17 VITALS
SYSTOLIC BLOOD PRESSURE: 138 MMHG | OXYGEN SATURATION: 99 % | RESPIRATION RATE: 18 BRPM | HEART RATE: 104 BPM | TEMPERATURE: 98.3 F | DIASTOLIC BLOOD PRESSURE: 88 MMHG

## 2023-09-17 DIAGNOSIS — H66.92 LEFT OTITIS MEDIA: ICD-10-CM

## 2023-09-17 DIAGNOSIS — J06.9 URI (UPPER RESPIRATORY INFECTION): Primary | ICD-10-CM

## 2023-09-17 LAB
FLUAV RNA RESP QL NAA+PROBE: NEGATIVE
FLUBV RNA RESP QL NAA+PROBE: NEGATIVE
RSV RNA RESP QL NAA+PROBE: NEGATIVE
SARS-COV-2 RNA RESP QL NAA+PROBE: POSITIVE

## 2023-09-17 PROCEDURE — 0241U HB NFCT DS VIR RESP RNA 4 TRGT: CPT | Performed by: EMERGENCY MEDICINE

## 2023-09-17 PROCEDURE — 99284 EMERGENCY DEPT VISIT MOD MDM: CPT | Performed by: EMERGENCY MEDICINE

## 2023-09-17 PROCEDURE — 99284 EMERGENCY DEPT VISIT MOD MDM: CPT

## 2023-09-17 RX ORDER — AMOXICILLIN 250 MG/1
500 CAPSULE ORAL ONCE
Status: COMPLETED | OUTPATIENT
Start: 2023-09-17 | End: 2023-09-17

## 2023-09-17 RX ORDER — IBUPROFEN 600 MG/1
600 TABLET ORAL ONCE
Status: COMPLETED | OUTPATIENT
Start: 2023-09-17 | End: 2023-09-17

## 2023-09-17 RX ORDER — AMOXICILLIN 500 MG/1
500 CAPSULE ORAL 3 TIMES DAILY
Qty: 21 CAPSULE | Refills: 0 | Status: SHIPPED | OUTPATIENT
Start: 2023-09-17 | End: 2023-09-24

## 2023-09-17 RX ADMIN — IBUPROFEN 600 MG: 600 TABLET, FILM COATED ORAL at 08:26

## 2023-09-17 RX ADMIN — AMOXICILLIN 500 MG: 250 CAPSULE ORAL at 08:26

## 2023-09-17 NOTE — ED NOTES
Pt ambulated out of unit with steady gait. In no acute distress. Denies questions at time of DC.      Lavonda Moritz, RN  09/17/23 4411

## 2023-09-17 NOTE — ED PROVIDER NOTES
History  Chief Complaint   Patient presents with   • Cold Like Symptoms     Pt c/o runny nose, congestion, cough L ear pain x3 days. Denies fevers or ill contacts     This is a 58-year-old female who presents to the emergency department complaining of a runny nose, congestion, and pain to her left ear. She states this started 3 days ago. She denies fevers or chills. She denies nausea or vomiting. She denies chest pain or trouble breathing. She states that she feels like this is her allergies significantly worse. She denies loss of taste or smell. She denies back pain. Prior to Admission Medications   Prescriptions Last Dose Informant Patient Reported? Taking?    Alcohol Swabs (Alcohol Prep) 70 % PADS   No No   Sig: Use 2 (two) times a day   Blood Glucose Monitoring Suppl (OneTouch Verio Reflect) w/Device KIT   No No   Sig: Use 1 Device 3 (three) times a day   Continuous Blood Gluc  (FreeStyle Miguelina 14 Day [place]) KAYLYNN   No No   Sig: Dispense one reader   Patient not taking: Reported on 4/6/2023   Continuous Blood Gluc Sensor (FreeStyle Miguelina 14 Day Sensor) [facility]   No No   Sig: Use 1 application 3 (three) times a day   Patient not taking: Reported on 4/6/2023   Enskyce 0.15-30 MG-MCG per tablet  Self Yes No   Sig: Take 1 tablet by mouth daily   Enskyce 0.15-30 MG-MCG per tablet   No No   Sig: take 1 tablet by mouth once daily   Janumet  MG per tablet   No No   Sig: take 1 tablet by mouth twice a day with MEALS   Lancets (OneTouch Delica Plus [id number]) MISC   No No   Sig: Use 1 each 3 (three) times a day   citalopram (CeleXA) 10 mg tablet   No No   Sig: take 1 tablet by mouth once daily   clobetasol (TEMOVATE) 0.05 % ointment   No No   Sig: Apply topically 2 (two) times a day for 7 days   dulaglutide (Trulicity) 3.32 IO/1.3DZ injection   No No   Sig: Inject 0.5 mL (0.75 mg total) under the skin every 7 days   glucose blood (OneTouch Verio) test strip   No No   Sig: Use as instructed triamcinolone (KENALOG) 0.1 % cream   No No   Sig: Apply topically 2 (two) times a day      Facility-Administered Medications: None       Past Medical History:   Diagnosis Date   • Diabetes mellitus ([address])    • PCOS (polycystic ovarian syndrome)        Past Surgical History:   Procedure Laterality Date   • WISDOM TOOTH EXTRACTION         Family History   Problem Relation Age of Onset   • Diabetes Mother    • Diabetes Paternal Grandmother    • Diabetes Paternal Grandfather      I have reviewed and agree with the history as documented. E-Cigarette/Vaping   • E-Cigarette Use Never User      E-Cigarette/Vaping Substances   • Nicotine No    • THC No    • CBD No    • Flavoring No    • Other No    • Unknown No      Social History     Tobacco Use   • Smoking status: Never   • Smokeless tobacco: Never   Vaping Use   • Vaping Use: Never used   Substance Use Topics   • Alcohol use: Yes     Comment: socially   • Drug use: Yes     Types: Marijuana       Review of Systems   All other systems reviewed and are negative. Physical Exam  Physical Exam  Constitutional:  Vital signs reviewed, patient appears nontoxic, no acute distress  Eyes: Pupils equal round reactive to light and accommodation, extraocular muscles intact  HEENT: trachea midline, no JVD, moist mucous membranes, positive rhinorrhea, no pharyngeal erythema, no pharyngeal exudates, no tonsillar swelling, uvula midline, left TM  bulging with an air-fluid level.   Respiratory: lung sounds clear throughout, no rhonchi, no rales  Cardiovascular: regular rate rhythm, no murmurs or rubs  Abdomen: soft, nontender, nondistended, no rebound or guarding  Back: no CVA tenderness, normal inspection  Extremities: no edema, pulses equal in all 4 extremities  Neuro: awake, alert, oriented, no focal weakness  Skin: warm, dry, intact, no rashes noted    Vital Signs  ED Triage Vitals [09/17/23 0752]   Temperature Pulse Respirations Blood Pressure SpO2   98.3 °F (36.8 °C) 104 18 138/88 99 %      Temp Source Heart Rate Source Patient Position - Orthostatic VS BP Location FiO2 (%)   Oral Monitor Sitting Right arm --      Pain Score       9           Vitals:    09/17/23 0752   BP: 138/88   Pulse: 104   Patient Position - Orthostatic VS: Sitting         Visual Acuity      ED Medications  Medications   amoxicillin (AMOXIL) capsule 500 mg (500 mg Oral Given 9/17/23 0826)   ibuprofen (MOTRIN) tablet 600 mg (600 mg Oral Given 9/17/23 0826)       Diagnostic Studies  Results Reviewed     Procedure Component Value Units Date/Time    FLU/RSV/COVID - if FLU/RSV clinically relevant [[id number]] Collected: 09/17/23 0824    Lab Status: In process Specimen: Nares from Nose Updated: 09/17/23 0835                 No orders to display              Procedures  Procedures         ED Course                                             Medical Decision Making  This is a 59-year-old female presents to the emergency department planing of a runny nose and congestion. I considered COVID, flu, allergies, otitis media. These and other diagnoses were considered. The patient had a exam consistent with a viral illness versus otitis media. Given the patient's symptoms and pain to the left ear I opted to treat the patient for otitis media. She was also tested for COVID and flu as well. The patient will be called with the result. The patient was discharged with follow-up to her primary care physician. Left otitis media: acute illness or injury  URI (upper respiratory infection): acute illness or injury  Risk  Prescription drug management.           Disposition  Final diagnoses:   URI (upper respiratory infection)   Left otitis media     Time reflects when diagnosis was documented in both MDM as applicable and the Disposition within this note     Time User Action Codes Description Comment    9/17/2023  8:14 AM Candace Boo Add [J06.9] URI (upper respiratory infection)     9/17/2023  8:14 AM Yang Maloney Add [H66.92] Left otitis media       ED Disposition     ED Disposition   Discharge    Condition   Stable    Date/Time   Sun Sep 17, 2023  8:14 AM    Comment   Karrie Priest discharge to home/self care.                Follow-up Information     Follow up With Specialties Details Why Contact Info Additional [address] [facility] Emergency Department Emergency Medicine  If symptoms worsen [address] [phone] [address] [facility] Emergency Department, [address] , [address]    [address] [facility] Family Medicine Schedule an appointment as soon as possible for a visit in 2 days  [address] [phone]  [address] [facility], [place], [place], [address]          Discharge Medication List as of 9/17/2023  8:26 AM      START taking these medications    Details   amoxicillin (AMOXIL) 500 mg capsule Take 1 capsule (500 mg total) by mouth 3 (three) times a day for 7 days, Starting Sun 9/17/2023, Until Sun 9/24/2023, Normal         CONTINUE these medications which have NOT CHANGED    Details   Alcohol Swabs (Alcohol Prep) 70 % PADS Use 2 (two) times a day, Starting Thu 1/5/2023, Normal      Blood Glucose Monitoring Suppl (OneTouch Verio Reflect) w/Device KIT Use 1 Device 3 (three) times a day, Starting Mon 5/22/2023, Normal      citalopram (CeleXA) 10 mg tablet take 1 tablet by mouth once daily, Normal      clobetasol (TEMOVATE) 0.05 % ointment Apply topically 2 (two) times a day for 7 days, Starting Thu 4/6/2023, Until Tue 5/23/2023, Normal      Continuous Blood Gluc  (FreeStyle [place] 14 Day [place]) Cathy Summers Dispense one reader, Normal      Continuous Blood Gluc Sensor (FreeStyle Miguelina 14 Day Sensor) MISC Use 1 application 3 (three) times a day, Starting Thu 1/5/2023, Normal      dulaglutide (Trulicity) 7.60 ZY/9.7YY injection Inject 0.5 mL (0.75 mg total) under the skin every 7 days, Starting Tue 5/2/2023, Normal      !! Enskyce 0.15-30 MG-MCG per tablet Take 1 tablet by mouth daily, Starting Fri 11/18/2022, Historical Med      !! Enskyce 0.15-30 MG-MCG per tablet take 1 tablet by mouth once daily, Starting Mon 8/14/2023, Normal      glucose blood (OneTouch Verio) test strip Use as instructed, Normal      Janumet  MG per tablet take 1 tablet by mouth twice a day with MEALS, Normal      Lancets (OneTouch Delica Plus [id number]) MISC Use 1 each 3 (three) times a day, Starting Tue 5/2/2023, Normal      triamcinolone (KENALOG) 0.1 % cream Apply topically 2 (two) times a day, Starting Tue 5/23/2023, Normal       !! - Potential duplicate medications found. Please discuss with provider. No discharge procedures on file.     PDMP Review     None          ED Provider  Electronically Signed by           Raya Marinelli DO  09/17/23 9756

## 2023-09-25 ENCOUNTER — TELEPHONE (OUTPATIENT)
Dept: OBGYN CLINIC | Facility: CLINIC | Age: 23
End: 2023-09-25

## 2023-09-25 NOTE — TELEPHONE ENCOUNTER
Placed call to patient, patient wanted to schedule an appointment in additional to her APE for PCOS and internal dryness. Patient saw Dr. Yarelis Chen prior and was referred to our office.

## 2023-09-26 ENCOUNTER — TELEPHONE (OUTPATIENT)
Dept: INTERNAL MEDICINE CLINIC | Facility: CLINIC | Age: 23
End: 2023-09-26

## 2023-09-26 NOTE — TELEPHONE ENCOUNTER
Hi, good afternoon. This is Jimmy Welch, Date of birth 2000, callback number [phone], calling in regards of getting my prescriptions transferred over from the [facility] that I closed on [address] to the [facility] on [address] I did have one of the associates at your location call me and leave a voicemail saying to give you guys a call back, but I haven't gotten a hold of anybody. So if you could give me a call back, preferably before 6:00, that'd be awesome. If so, tomorrow I'm available all day. Typically I'm at work, so if you give me a call back [phone]. Thank you.

## 2023-09-26 NOTE — TELEPHONE ENCOUNTER
Called patient back at phone number provided about which medications specifically need to be sent to [address] however no answer. Voicemail left.

## 2023-10-10 DIAGNOSIS — E28.2 PCOS (POLYCYSTIC OVARIAN SYNDROME): Primary | ICD-10-CM

## 2023-10-10 DIAGNOSIS — F41.1 GAD (GENERALIZED ANXIETY DISORDER): ICD-10-CM

## 2023-10-11 ENCOUNTER — TELEPHONE (OUTPATIENT)
Dept: INTERNAL MEDICINE CLINIC | Facility: CLINIC | Age: 23
End: 2023-10-11

## 2023-10-11 RX ORDER — DESOGESTREL AND ETHINYL ESTRADIOL 0.15-0.03
1 KIT ORAL DAILY
Qty: 28 TABLET | Refills: 5 | Status: SHIPPED | OUTPATIENT
Start: 2023-10-11

## 2023-10-11 RX ORDER — CITALOPRAM HYDROBROMIDE 10 MG/1
TABLET ORAL
Qty: 30 TABLET | Refills: 0 | Status: SHIPPED | OUTPATIENT
Start: 2023-10-11

## 2023-10-11 NOTE — TELEPHONE ENCOUNTER
Please call patient for follow up her last visit here  was May and she had an A1C of 11.5 and we should see her in the office again .  Thank you

## 2023-10-17 DIAGNOSIS — E11.65 TYPE 2 DIABETES MELLITUS WITH HYPERGLYCEMIA, WITHOUT LONG-TERM CURRENT USE OF INSULIN (HCC): ICD-10-CM

## 2023-10-17 DIAGNOSIS — F41.1 GAD (GENERALIZED ANXIETY DISORDER): ICD-10-CM

## 2023-10-17 RX ORDER — UBIQUINOL 100 MG
CAPSULE ORAL 2 TIMES DAILY
Qty: 90 EACH | Refills: 0 | Status: SHIPPED | OUTPATIENT
Start: 2023-10-17

## 2023-10-17 RX ORDER — DULAGLUTIDE 0.75 MG/.5ML
0.75 INJECTION, SOLUTION SUBCUTANEOUS
Qty: 6 ML | Refills: 1 | Status: SHIPPED | OUTPATIENT
Start: 2023-10-17

## 2023-10-17 RX ORDER — CITALOPRAM HYDROBROMIDE 10 MG/1
10 TABLET ORAL DAILY
Qty: 30 TABLET | Refills: 0 | Status: CANCELLED | OUTPATIENT
Start: 2023-10-17

## 2023-10-17 NOTE — TELEPHONE ENCOUNTER
Hi, good afternoon. [facility] . My phone number is [phone]. I'm calling in regards to the [facility] on [address] closing and my medication getting transferred over to [facility] on [address] [facility]. They have not received any confirmation via the doctor to transfer over my medication so that's why I'm calling so that way they can get confirmation. That way I could  my medication again. It's Kimi for a [phone] calling in regards to transferring over my prescription from [facility] that closed on [address] to [facility] on [address] That is now accepting my prescription. Thank you.

## 2023-11-10 ENCOUNTER — VBI (OUTPATIENT)
Dept: ADMINISTRATIVE | Facility: OTHER | Age: 23
End: 2023-11-10

## 2023-11-11 DIAGNOSIS — E11.65 TYPE 2 DIABETES MELLITUS WITH HYPERGLYCEMIA, WITHOUT LONG-TERM CURRENT USE OF INSULIN (HCC): ICD-10-CM

## 2023-11-13 DIAGNOSIS — F41.1 GAD (GENERALIZED ANXIETY DISORDER): ICD-10-CM

## 2023-11-13 RX ORDER — CITALOPRAM HYDROBROMIDE 10 MG/1
10 TABLET ORAL DAILY
Qty: 30 TABLET | Refills: 0 | Status: SHIPPED | OUTPATIENT
Start: 2023-11-13

## 2023-11-13 RX ORDER — SITAGLIPTIN AND METFORMIN HYDROCHLORIDE 1000; 50 MG/1; MG/1
TABLET, FILM COATED ORAL
Qty: 60 TABLET | Refills: 0 | Status: SHIPPED | OUTPATIENT
Start: 2023-11-13

## 2023-11-13 NOTE — TELEPHONE ENCOUNTER
Will refill one months supply for now. Patient needs to complete a CMP and HBA1C and follow up in the office for evaluation within the next month for further evaluation of her diabetes.

## 2023-11-13 NOTE — TELEPHONE ENCOUNTER
I called patient's alternate phone number and left message to call our office, also informed her that she needed to have labs done prior to next visit within a month.

## 2023-11-15 ENCOUNTER — HOSPITAL ENCOUNTER (EMERGENCY)
Facility: HOSPITAL | Age: 23
Discharge: HOME | End: 2023-11-15
Attending: EMERGENCY MEDICINE
Payer: COMMERCIAL

## 2023-11-15 ENCOUNTER — APPOINTMENT (EMERGENCY)
Dept: RADIOLOGY | Facility: HOSPITAL | Age: 23
End: 2023-11-15
Payer: COMMERCIAL

## 2023-11-15 ENCOUNTER — VBI (OUTPATIENT)
Dept: ADMINISTRATIVE | Facility: OTHER | Age: 23
End: 2023-11-15

## 2023-11-15 VITALS
OXYGEN SATURATION: 100 % | SYSTOLIC BLOOD PRESSURE: 152 MMHG | BODY MASS INDEX: 34.41 KG/M2 | TEMPERATURE: 98.2 F | DIASTOLIC BLOOD PRESSURE: 95 MMHG | RESPIRATION RATE: 17 BRPM | HEIGHT: 68 IN | HEART RATE: 90 BPM | WEIGHT: 227.07 LBS

## 2023-11-15 DIAGNOSIS — S91.331A PUNCTURE WOUND OF PLANTAR ASPECT OF RIGHT FOOT, INITIAL ENCOUNTER: Primary | ICD-10-CM

## 2023-11-15 DIAGNOSIS — M79.5 FOREIGN BODY (FB) IN SOFT TISSUE: ICD-10-CM

## 2023-11-15 DIAGNOSIS — E11.9 DIABETES (HCC): ICD-10-CM

## 2023-11-15 PROCEDURE — 90715 TDAP VACCINE 7 YRS/> IM: CPT | Performed by: EMERGENCY MEDICINE

## 2023-11-15 PROCEDURE — 73630 X-RAY EXAM OF FOOT: CPT

## 2023-11-15 PROCEDURE — 90471 IMMUNIZATION ADMIN: CPT

## 2023-11-15 PROCEDURE — 99284 EMERGENCY DEPT VISIT MOD MDM: CPT | Performed by: EMERGENCY MEDICINE

## 2023-11-15 PROCEDURE — 99282 EMERGENCY DEPT VISIT SF MDM: CPT

## 2023-11-15 RX ORDER — CIPROFLOXACIN 750 MG/1
750 TABLET, FILM COATED ORAL 2 TIMES DAILY
Qty: 19 TABLET | Refills: 0 | Status: SHIPPED | OUTPATIENT
Start: 2023-11-15 | End: 2023-11-25

## 2023-11-15 RX ADMIN — CIPROFLOXACIN 750 MG: 500 TABLET, FILM COATED ORAL at 06:00

## 2023-11-15 RX ADMIN — TETANUS TOXOID, REDUCED DIPHTHERIA TOXOID AND ACELLULAR PERTUSSIS VACCINE, ADSORBED 0.5 ML: 5; 2.5; 8; 8; 2.5 SUSPENSION INTRAMUSCULAR at 06:02

## 2023-11-15 NOTE — ED PROVIDER NOTES
History  Chief Complaint   Patient presents with    Foot Laceration     Pt stepped on nail last evening, was able to control bleeding at home, but pain is still severe. HPI    Prior to Admission Medications   Prescriptions Last Dose Informant Patient Reported? Taking?    Alcohol Swabs (Alcohol Prep) 70 % PADS   No No   Sig: Use 2 (two) times a day   Blood Glucose Monitoring Suppl (OneTouch Verio Reflect) w/Device KIT   No No   Sig: Use 1 Device 3 (three) times a day   Continuous Blood Gluc  (FreeStyle Miguelina 14 Day [place]) KAYLYNN   No No   Sig: Dispense one reader   Patient not taking: Reported on 4/6/2023   Continuous Blood Gluc Sensor (FreeStyle Miguelina 14 Day Sensor) [facility]   No No   Sig: Use 1 application 3 (three) times a day   Patient not taking: Reported on 4/6/2023   Enskyce 0.15-30 MG-MCG per tablet  Self Yes No   Sig: Take 1 tablet by mouth daily   Enskyce 0.15-30 MG-MCG per tablet   No No   Sig: take 1 tablet by mouth once daily   Lancets (OneTouch Delica Plus [id number]) MISC   No No   Sig: Use 1 each 3 (three) times a day   citalopram (CeleXA) 10 mg tablet   No No   Sig: TAKE 1 TABLET BY MOUTH EVERY DAY   clobetasol (TEMOVATE) 0.05 % ointment   No No   Sig: Apply topically 2 (two) times a day for 7 days   desogestrel-ethinyl estradiol (Enskyce) 0.15-30 MG-MCG per tablet   No No   Sig: TAKE 1 TABLET BY MOUTH ONCE DAILY   dulaglutide (Trulicity) 2.01 PP/2.1EZ injection   No No   Sig: Inject 0.5 mL (0.75 mg total) under the skin every 7 days   glucose blood (OneTouch Verio) test strip   No No   Sig: Use as instructed   sitaGLIPtin-metFORMIN (Janumet)  MG per tablet   No No   Sig: TAKE 1 TABLET BY MOUTH TWICE A DAY WITH MEALS   triamcinolone (KENALOG) 0.1 % cream   No No   Sig: Apply topically 2 (two) times a day      Facility-Administered Medications: None       Past Medical History:   Diagnosis Date    Diabetes mellitus (HCC)     PCOS (polycystic ovarian syndrome)        Past Surgical History: Procedure Laterality Date    WISDOM TOOTH EXTRACTION         Family History   Problem Relation Age of Onset    Diabetes Mother     Diabetes Paternal Grandmother     Diabetes Paternal Grandfather      I have reviewed and agree with the history as documented. E-Cigarette/Vaping    E-Cigarette Use Never User      E-Cigarette/Vaping Substances    Nicotine No     THC No     CBD No     Flavoring No     Other No     Unknown No      Social History     Tobacco Use    Smoking status: Never    Smokeless tobacco: Never   Vaping Use    Vaping Use: Never used   Substance Use Topics    Alcohol use: Not Currently     Comment: socially    Drug use: Yes     Types: Marijuana       Review of Systems    Physical Exam  Physical Exam    Vital Signs  ED Triage Vitals   Temperature Pulse Respirations Blood Pressure SpO2   11/15/23 0537 11/15/23 0537 11/15/23 0537 11/15/23 0537 11/15/23 0537   98.2 °F (36.8 °C) 90 17 152/95 100 %      Temp Source Heart Rate Source Patient Position - Orthostatic VS BP Location FiO2 (%)   11/15/23 0537 11/15/23 0537 11/15/23 0537 11/15/23 0537 --   Oral Monitor Sitting Left arm       Pain Score       11/15/23 0540       9           Vitals:    11/15/23 0537   BP: 152/95   Pulse: 90   Patient Position - Orthostatic VS: Sitting         Visual Acuity      ED Medications  Medications   ciprofloxacin (CIPRO) tablet 750 mg (750 mg Oral Given 11/15/23 0600)   tetanus-diphtheria-acellular pertussis (BOOSTRIX) IM injection 0.5 mL (0.5 mL Intramuscular Given 11/15/23 0602)       Diagnostic Studies  Results Reviewed       None                   XR foot 3+ views RIGHT   ED Interpretation by Mariia Elkins MD (11/15 3684)   No acute fracture or dislocation. On the lateral view there appears to be approximately 3, small, radiodense/likely metallic retained foreign bodies from the patient's reported accidental metallic nail puncture wound yesterday through right shoe.                  Procedures  Procedures         ED Course  ED Course as of 11/15/23 0653   Wed Nov 15, 2023   0611 XR foot 3+ views RIGHT  No acute fracture or dislocation. On the lateral view there appears to be approximately 3, small, radiodense/likely metallic retained foreign bodies from the patient's reported accidental metallic nail puncture wound yesterday through right shoe. SBIRT 20yo+      Flowsheet Row Most Recent Value   Initial Alcohol Screen: US AUDIT-C     1. How often do you have a drink containing alcohol? 2 Filed at: 11/15/2023 0545   2. How many drinks containing alcohol do you have on a typical day you are drinking? 1 Filed at: 11/15/2023 0545   3b. FEMALE Any Age, or MALE 65+: How often do you have 4 or more drinks on one occassion? 0 Filed at: 11/15/2023 0545   Audit-C Score 3 Filed at: 11/15/2023 5595   DIYA: How many times in the past year have you. .. Used an illegal drug or used a prescription medication for non-medical reasons? Never Filed at: 11/15/2023 0545                      Medical Decision Making  Amount and/or Complexity of Data Reviewed  Radiology: ordered and independent interpretation performed. Decision-making details documented in ED Course. Risk  Prescription drug management. Disposition  Final diagnoses:   Puncture wound of plantar aspect of right foot, initial encounter   Foreign body (FB) in soft tissue   Diabetes ([address])     Time reflects when diagnosis was documented in both MDM as applicable and the Disposition within this note       Time User Action Codes Description Comment    11/15/2023  6:12 AM Christianne Felty Add [S22.232X] Puncture wound of plantar aspect of right foot, initial encounter     11/15/2023  6:12 AM Christianne Felty Add [M79.5] Foreign body (FB) in soft tissue     11/15/2023  6:12 AM Christianne Felty Add Miki.Manning. 0XXA] Nail entering through skin     11/15/2023  6:12 AM Lui Bach [Q90. 0XXA] Nail entering through skin     11/15/2023  6:13 AM Yosi Esther Ornelas Add [E11.9] Diabetes [facility])           ED Disposition       ED Disposition   Discharge    Condition   Stable    Date/Time   Wed Nov 15, 2023 [address] discharge to home/self care.                    Follow-up Information       Follow up With Specialties Details Why Contact Info Additional Information    [id number] [facility] Call today To schedule close podiatry follow-up [address] [phone]  [phone] [address] [facility] [address] [facility], [facility]  ([address] Emergency Department Emergency Medicine Go to  If symptoms worsen [address] [phone]  [address] [facility] Emergency Department, [address] [place] [address] Dr, [address]            Discharge Medication List as of 11/15/2023  6:18 AM        START taking these medications    Details   ciprofloxacin (CIPRO) 750 mg tablet Take 1 tablet (750 mg total) by mouth 2 (two) times a day for 10 days, Starting Wed 11/15/2023, Until Sat 11/25/2023, Normal           CONTINUE these medications which have NOT CHANGED    Details   Alcohol Swabs (Alcohol Prep) 70 % PADS Use 2 (two) times a day, Starting Tue 10/17/2023, Normal      Blood Glucose Monitoring Suppl (OneTouch Verio Reflect) w/Device KIT Use 1 Device 3 (three) times a day, Starting Mon 5/22/2023, Normal      citalopram (CeleXA) 10 mg tablet TAKE 1 TABLET BY MOUTH EVERY DAY, Starting Mon 11/13/2023, Normal      clobetasol (TEMOVATE) 0.05 % ointment Apply topically 2 (two) times a day for 7 days, Starting Thu 4/6/2023, Until Tue 5/23/2023, Normal      Continuous Blood Gluc  (FreeStyle [place] 14 Day [place]) Laurie Mom Dispense one reader, Normal      Continuous Blood Gluc Sensor (FreeStyle Miguelina 14 Day Sensor) MISC Use 1 application 3 (three) times a day, Starting Thu 1/5/2023, Normal      !! desogestrel-ethinyl estradiol (Enskyce) 0.15-30 MG-MCG per tablet TAKE 1 TABLET BY MOUTH ONCE DAILY, Starting Wed 10/11/2023, Normal      dulaglutide (Trulicity) 8.05 OY/3.6LY injection Inject 0.5 mL (0.75 mg total) under the skin every 7 days, Starting Tue 10/17/2023, Normal      !! Enskyce 0.15-30 MG-MCG per tablet Take 1 tablet by mouth daily, Starting Fri 11/18/2022, Historical Med      !! Enskyce 0.15-30 MG-MCG per tablet take 1 tablet by mouth once daily, Starting Mon 8/14/2023, Normal      glucose blood (OneTouch Verio) test strip Use as instructed, Normal      Lancets (OneTouch Delica Plus [id number]) MISC Use 1 each 3 (three) times a day, Starting Tue 5/2/2023, Normal      sitaGLIPtin-metFORMIN (Janumet)  MG per tablet TAKE 1 TABLET BY MOUTH TWICE A DAY WITH MEALS, Normal      triamcinolone (KENALOG) 0.1 % cream Apply topically 2 (two) times a day, Starting Tue 5/23/2023, Normal       !! - Potential duplicate medications found. Please discuss with provider.               PDMP Review       None            ED Provider  Electronically Signed by management. Disposition  Final diagnoses:   Puncture wound of plantar aspect of right foot, initial encounter   Foreign body (FB) in soft tissue   Diabetes ([address])     Time reflects when diagnosis was documented in both MDM as applicable and the Disposition within this note       Time User Action Codes Description Comment    11/15/2023  6:12 AM Lev Lara Add [N81.791R] Puncture wound of plantar aspect of right foot, initial encounter     11/15/2023  6:12 AM Lev Lara Add [M79.5] Foreign body (FB) in soft tissue     11/15/2023  6:12 AM Lev Lara Add Diaz.Ruben. 0XXA] Nail entering through skin     11/15/2023  6:12 AM Rosa Maria Chapa [W09. 0XXA] Nail entering through skin     11/15/2023  6:13 AM Lev Lara Add [E11.9] Diabetes [facility])           ED Disposition       ED Disposition   Discharge    Condition   Stable    Date/Time   Wed Nov 15, 2023 [address] [place] [address] discharge to home/self care.                    Follow-up Information       Follow up With Specialties Details Why Contact Info Additional Information    [address] [facility] Call today To schedule close podiatry follow-up [address] [phone]  [address] [place] [address], [facility]  ([address] [facility] Emergency Department Emergency Medicine Go to  If symptoms worsen [address] [facility] Emergency Department, [address] [place] [address] Dr, [address] [facility] [address]            Discharge Medication List as of 11/15/2023  6:18 AM        START taking these medications    Details   ciprofloxacin (CIPRO) 750 mg tablet Take 1 tablet (750 mg total) by mouth 2 (two) times a day for 10 days, Starting Wed 11/15/2023, Until Sat 11/25/2023, Normal           CONTINUE these medications which have NOT CHANGED    Details   Alcohol Swabs (Alcohol Prep) 70 % PADS Use 2 (two) times a day, Starting Tue 10/17/2023, Normal      Blood Glucose Monitoring Suppl (OneTouch Verio Reflect) w/Device KIT Use 1 Device 3 (three) times a day, Starting Mon 5/22/2023, Normal      citalopram (CeleXA) 10 mg tablet TAKE 1 TABLET BY MOUTH EVERY DAY, Starting Mon 11/13/2023, Normal      clobetasol (TEMOVATE) 0.05 % ointment Apply topically 2 (two) times a day for 7 days, Starting Thu 4/6/2023, Until Tue 5/23/2023, Normal      Continuous Blood Gluc  (FreeStyle [place] 14 Day [place]) Moiz Dus Dispense one reader, Normal      Continuous Blood Gluc Sensor (FreeStyle Miguelina 14 Day Sensor) MISC Use 1 application 3 (three) times a day, Starting Thu 1/5/2023, Normal      !! desogestrel-ethinyl estradiol (Enskyce) 0.15-30 MG-MCG per tablet TAKE 1 TABLET BY MOUTH ONCE DAILY, Starting Wed 10/11/2023, Normal      dulaglutide (Trulicity) 9.60 PF/1.8VZ injection Inject 0.5 mL (0.75 mg total) under the skin every 7 days, Starting Tue 10/17/2023, Normal      !! Enskyce 0.15-30 MG-MCG per tablet Take 1 tablet by mouth daily, Starting Fri 11/18/2022, Historical Med      !! Enskyce 0.15-30 MG-MCG per tablet take 1 tablet by mouth once daily, Starting Mon 8/14/2023, Normal      glucose blood (OneTouch Verio) test strip Use as instructed, Normal      Lancets (OneTouch Delica Plus [id number]) MISC Use 1 each 3 (three) times a day, Starting Tue 5/2/2023, Normal      sitaGLIPtin-metFORMIN (Janumet)  MG per tablet TAKE 1 TABLET BY MOUTH TWICE A DAY WITH MEALS, Normal      triamcinolone (KENALOG) 0.1 % cream Apply topically 2 (two) times a day, Starting Tue 5/23/2023, Normal       !! - Potential duplicate medications found. Please discuss with provider.               PDMP Review       None            ED Provider  Electronically Signed by             Eddie Simons MD  12/08/23 5723

## 2023-11-15 NOTE — Clinical Note
Zehra Gutierrez was seen and treated in our emergency department on 11/15/2023. No restrictions    Other - See Comments    Limit weightbearing as tolerated on right foot    Diagnosis: Puncture wound, right foot, retained soft tissue foreign bodies    Kimi  may return to work on return date, is off the rest of the shift today. She may return on this date: 11/20/2023         If you have any questions or concerns, please don't hesitate to call.       Riley Knowles MD    ______________________________           _______________          _______________  [employer] Representative                              Date                                Time

## 2023-11-16 ENCOUNTER — TELEPHONE (OUTPATIENT)
Age: 23
End: 2023-11-16

## 2023-11-16 NOTE — TELEPHONE ENCOUNTER
Patient called back and stated she already went to the ED and they put referral in for podiatry. She would like a return call as she does not want to go back to ED.  Thank you

## 2023-11-16 NOTE — TELEPHONE ENCOUNTER
Caller: Rickie Yeh    Doctor/Office: Dr. Lillie Sun    #: [phone]    Escalation: Appointment Patient has an ASAP referral in [facility]. She stepped on a nail, pieces still in her foot, cannot walk on it. She is diabetic. Please return call and force.  Thank you

## 2023-11-16 NOTE — TELEPHONE ENCOUNTER
I did call again, no answer.  I left a VM to see if she can get in with another Dr in [facility]

## 2023-11-17 ENCOUNTER — OFFICE VISIT (OUTPATIENT)
Dept: PODIATRY | Facility: CLINIC | Age: 23
End: 2023-11-17
Payer: COMMERCIAL

## 2023-11-17 ENCOUNTER — TELEPHONE (OUTPATIENT)
Age: 23
End: 2023-11-17

## 2023-11-17 VITALS
HEART RATE: 91 BPM | HEIGHT: 68 IN | DIASTOLIC BLOOD PRESSURE: 98 MMHG | BODY MASS INDEX: 33.65 KG/M2 | SYSTOLIC BLOOD PRESSURE: 132 MMHG | OXYGEN SATURATION: 97 % | WEIGHT: 222 LBS

## 2023-11-17 DIAGNOSIS — M79.5 FOREIGN BODY (FB) IN SOFT TISSUE: ICD-10-CM

## 2023-11-17 DIAGNOSIS — S91.331A PUNCTURE WOUND OF RIGHT FOOT, INITIAL ENCOUNTER: ICD-10-CM

## 2023-11-17 DIAGNOSIS — E11.9 DIABETES (HCC): ICD-10-CM

## 2023-11-17 DIAGNOSIS — E11.65 TYPE 2 DIABETES MELLITUS WITH HYPERGLYCEMIA, WITHOUT LONG-TERM CURRENT USE OF INSULIN (HCC): ICD-10-CM

## 2023-11-17 DIAGNOSIS — M79.671 PAIN OF RIGHT HEEL: Primary | ICD-10-CM

## 2023-11-17 DIAGNOSIS — M76.61 ACHILLES TENDINITIS, RIGHT LEG: ICD-10-CM

## 2023-11-17 DIAGNOSIS — S91.331A PUNCTURE WOUND OF PLANTAR ASPECT OF RIGHT FOOT, INITIAL ENCOUNTER: ICD-10-CM

## 2023-11-17 PROCEDURE — 99203 OFFICE O/P NEW LOW 30 MIN: CPT | Performed by: PODIATRIST

## 2023-11-17 NOTE — TELEPHONE ENCOUNTER
Caller: Patient    Doctor: Clarisse Contreras    Reason for call: patient is still concerned about going back to work on Monday. Just following up on below message.     Call back#: [phone]

## 2023-11-17 NOTE — PROGRESS NOTES
Assessment/Plan:     The patient's clinical examination today is significant for a stable appearing puncture wound to the plantar aspect of the right heel without any erythema nor edema nor calor nor ecchymosis. There is persistent moderate tenderness to palpation to the puncture site. There is no active drainage no purulence. The patient also notes some mild discomfort along her lower left leg from walking on the ball of her right foot. There is no calf pain nor tenderness with lateral squeeze. There is negative Homans' sign. Recent x-rays of the right foot were personally reviewed and interpreted. Findings were significant for retained metallic foreign debris at the site of the puncture wound. The puncture wound was flushed with Betadine and saline mixture utilizing an 18-gauge needle. The puncture site was gently debrided of callus tissue and nonviable skin utilizing a sterile 15 blade without complication. An antimicrobial dressing was applied along with dispersion padding. A cam boot was fitted and dispensed today for offloading of the puncture wound for the next 2 to 3 weeks. She will continue her course of Cipro as prescribed. She is tolerating it well. Recommend follow-up in 2 weeks. Diagnoses and all orders for this visit:    Pain of right heel  -     Cam Boot    Achilles tendinitis, right leg  -     Cam Boot    Puncture wound of right foot, initial encounter  -     Cam Boot    Type 2 diabetes mellitus with hyperglycemia, without long-term current use of insulin ([facility])          Subjective:     Patient ID: Ayana Perez is a 24 y.o. female. The patient presents today for her initial consultation with [facility] for evaluation of a puncture wound to her right heel sustained about 3 days ago. She was seen in the local ED on November 15, 2023 where x-rays were taken revealing some retained foreign material from the puncture site.   The patient was started on oral Cipro she is tolerating well. She still notes tenderness to the plantar heel and is essentially walking on the ball of her right foot. This is causing some generalized lower leg discomfort along the Achilles tendon. PAST MEDICAL HISTORY:  Past Medical History:   Diagnosis Date    Diabetes mellitus ([address])     PCOS (polycystic ovarian syndrome)        PAST SURGICAL HISTORY:  Past Surgical History:   Procedure Laterality Date    WISDOM TOOTH EXTRACTION          ALLERGIES:  Patient has no known allergies.     MEDICATIONS:  Current Outpatient Medications   Medication Sig Dispense Refill    Alcohol Swabs (Alcohol Prep) 70 % PADS Use 2 (two) times a day 90 each 0    Blood Glucose Monitoring Suppl (OneTouch Verio Reflect) w/Device KIT Use 1 Device 3 (three) times a day 1 kit 0    ciprofloxacin (CIPRO) 750 mg tablet Take 1 tablet (750 mg total) by mouth 2 (two) times a day for 10 days 19 tablet 0    citalopram (CeleXA) 10 mg tablet TAKE 1 TABLET BY MOUTH EVERY DAY 30 tablet 0    desogestrel-ethinyl estradiol (Enskyce) 0.15-30 MG-MCG per tablet TAKE 1 TABLET BY MOUTH ONCE DAILY 28 tablet 5    dulaglutide (Trulicity) 5.43 WI/7.5II injection Inject 0.5 mL (0.75 mg total) under the skin every 7 days 6 mL 1    Enskyce 0.15-30 MG-MCG per tablet Take 1 tablet by mouth daily      Enskyce 0.15-30 MG-MCG per tablet take 1 tablet by mouth once daily 28 tablet 2    glucose blood (OneTouch Verio) test strip Use as instructed 100 strip 5    Lancets (OneTouch Delica Plus [id number]) MISC Use 1 each 3 (three) times a day 100 each 5    sitaGLIPtin-metFORMIN (Janumet)  MG per tablet TAKE 1 TABLET BY MOUTH TWICE A DAY WITH MEALS 60 tablet 0    triamcinolone (KENALOG) 0.1 % cream Apply topically 2 (two) times a day 30 g 0    clobetasol (TEMOVATE) 0.05 % ointment Apply topically 2 (two) times a day for 7 days 15 g 0    Continuous Blood Gluc  (FreeStyle Miguelina 14 Day [place]) KAYLYNN Dispense one reader (Patient not taking: Reported on 4/6/2023) 1 each 0    Continuous Blood Gluc Sensor (FreeStyle Miguelina 14 Day Sensor) MISC Use 1 application 3 (three) times a day (Patient not taking: Reported on 4/6/2023) 2 each 3     No current facility-administered medications for this visit. SOCIAL HISTORY:  Social History     Socioeconomic History    Marital status: Single     Spouse name: None    Number of children: None    Years of education: None    Highest education level: None   Occupational History    None   Tobacco Use    Smoking status: Never    Smokeless tobacco: Never   Vaping Use    Vaping Use: Never used   Substance and Sexual Activity    Alcohol use: Not Currently     Comment: socially    Drug use: Yes     Types: Marijuana    Sexual activity: Yes     Partners: Male     Birth control/protection: OCP   Other Topics Concern    None   Social History Narrative    None     Social Determinants of Health     Financial Resource Strain: Not on file   Food Insecurity: Not on file   Transportation Needs: Not on file   Physical Activity: Not on file   Stress: Not on file   Social Connections: Not on file   Intimate Partner Violence: Not on file   Housing Stability: Not on file        Review of Systems   Constitutional: Negative. HENT: Negative. Eyes: Negative. Respiratory: Negative. Cardiovascular: Negative. Endocrine: Negative. Musculoskeletal: Negative. Skin:  Positive for wound. Negative for color change. Neurological: Negative. Hematological: Negative. Psychiatric/Behavioral: Negative. Objective:     Physical Exam  Constitutional:       Appearance: Normal appearance. HENT:      Head: Normocephalic and atraumatic. Nose: Nose normal.   Cardiovascular:      Pulses:           Dorsalis pedis pulses are 2+ on the right side. Posterior tibial pulses are 2+ on the right side.    Pulmonary:      Effort: Pulmonary effort is normal.   Musculoskeletal:        Feet:    Feet:      Right foot: Skin integrity: Skin breakdown present. No erythema or warmth. Comments: The patient's clinical examination today is significant for a stable appearing puncture wound to the plantar aspect of the right heel without any erythema nor edema nor calor nor ecchymosis. There is persistent moderate tenderness to palpation to the puncture site. There is no active drainage no purulence. The patient also notes some mild discomfort along her lower left leg from walking on the ball of her right foot. There is no calf pain nor tenderness with lateral squeeze. There is negative Homans' sign. Skin:     General: Skin is warm. Capillary Refill: Capillary refill takes less than 2 seconds. Neurological:      General: No focal deficit present. Mental Status: She is alert and oriented to person, place, and time. Psychiatric:         Mood and Affect: Mood normal.         Behavior: Behavior normal.         Thought Content:  Thought content normal.

## 2023-11-17 NOTE — LETTER
November 17, 2023     Patient: Elli Jc  YOB: 2000  Date of Visit: 11/17/2023      To Whom it May Concern:    Elli Jc is under my professional care. Genevieve Cline was seen in my office on 11/17/2023. Genevieve Cline may return to work with limitations 11/17/23: she will need to work light duty; she will not be able to carry/lift/push/pull loads in excess of 15 pounds; she will not be able to stand or ambulate for extended periods in excess of 30 minutes at a time . These limitations should be in place for the next 2 weeks . If you have any questions or concerns, please don't hesitate to call.          Sincerely,          Marybel Gann DPM        CC: No Recipients

## 2023-11-17 NOTE — LETTER
November 17, 2023     Patient: Kristopher Maya  YOB: 2000  Date of Visit: 11/17/2023      To Whom it May Concern:    Kristopher Maya is under my professional care. Galen Barrett was seen in my office on 11/17/2023. Galen Barrett may return to work with limitations : light duty for 2 weeks . If you have any questions or concerns, please don't hesitate to call.          Sincerely,          Santa Galindo DPM        CC: No Recipients

## 2023-11-17 NOTE — TELEPHONE ENCOUNTER
Caller: Kimi     Doctor: Clarisse Cnotreras    Reason for call: Patient needs a more detailed note for her job she would like to go back to work on Monday She would like you to give her a call so she can give you more details.   Thank you     Call back#: [phone]

## 2023-11-20 NOTE — TELEPHONE ENCOUNTER
I called pt to see how she is doing and she is doing much better. She was at work today so I spoke with her boyfriend.

## 2023-11-27 ENCOUNTER — TELEPHONE (OUTPATIENT)
Age: 23
End: 2023-11-27

## 2023-11-27 NOTE — TELEPHONE ENCOUNTER
Caller: Patient    Doctor: Taylor Polo    Reason for call: Work note expires 3 days before her next visit. What should she do?       Call back#: [phone]

## 2023-12-05 ENCOUNTER — HOSPITAL ENCOUNTER (OUTPATIENT)
Dept: RADIOLOGY | Facility: HOSPITAL | Age: 23
Discharge: HOME | End: 2023-12-05
Attending: PODIATRIST
Payer: COMMERCIAL

## 2023-12-05 ENCOUNTER — OFFICE VISIT (OUTPATIENT)
Dept: PODIATRY | Facility: CLINIC | Age: 23
End: 2023-12-05
Payer: COMMERCIAL

## 2023-12-05 VITALS
DIASTOLIC BLOOD PRESSURE: 93 MMHG | BODY MASS INDEX: 33.75 KG/M2 | HEIGHT: 68 IN | OXYGEN SATURATION: 100 % | SYSTOLIC BLOOD PRESSURE: 128 MMHG | HEART RATE: 85 BPM

## 2023-12-05 DIAGNOSIS — S91.331A PUNCTURE WOUND OF RIGHT FOOT, INITIAL ENCOUNTER: Primary | ICD-10-CM

## 2023-12-05 DIAGNOSIS — S91.331A PUNCTURE WOUND OF RIGHT FOOT, INITIAL ENCOUNTER: ICD-10-CM

## 2023-12-05 DIAGNOSIS — M79.671 PAIN OF RIGHT HEEL: ICD-10-CM

## 2023-12-05 PROCEDURE — 99213 OFFICE O/P EST LOW 20 MIN: CPT | Performed by: PODIATRIST

## 2023-12-05 PROCEDURE — 73630 X-RAY EXAM OF FOOT: CPT

## 2023-12-05 NOTE — LETTER
December 5, 2023     Patient: Kailey Hawley  YOB: 2000  Date of Visit: 12/5/2023      To Whom it May Concern:    Kailey Hawley is under my professional care. Silvino Chaparro was seen in my office on 12/5/2023. Silvino Chaparro may return to work with limitations 12/04/23: she will need to work light duty; she will not be able to carry/lift/push/pull loads in excess of 15 pounds; she will not be able to stand or ambulate for extended periods in excess of 30 minutes at a time. She will need to wear a cam boot at work. These limitations should be in place for the next 4 weeks. She is currently being scheduled for right foot surgery. If you have any questions or concerns, please don't hesitate to call.          Sincerely,          Lesli Haddad DPM        CC: No Recipients

## 2023-12-05 NOTE — PROGRESS NOTES
Assessment    -25-year-old female presents for follow-up of foreign body of the plantar aspect of the right foot    Plan:   -Unfortunately during our second attempt to retrieve the foreign body we are unsuccessful given the depth and fragmented nature. Patient will require a washout with removal of foreign body in the operating room. All risk and benefits of the procedure were discussed with patient. She is amenable to proceeding forward. Consent was signed today and patient will pick a date for surgery.  -New work note was signed for patient. 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 -Bandage applied to the plantar aspect of the foot. Discussed wound care including keeping the area covered.  -Continue weightbearing as tolerated in cam boot right foot.  -Follow-up for surgical removal.    No problem-specific Assessment & Plan notes found for this encounter. Diagnoses and all orders for this visit:    Puncture wound of right foot, initial encounter  -     X-ray foot right 3+ views; Future    Pain of right heel          Subjective:      Patient ID: Karol Louis is a 24 y.o. female. 25-year-old female presents for follow-up of right foreign body removal.  Patient has been wearing her cam boot as prescribed and has been keeping the wound covered. She has been soaking her foot daily with hot water and she has been utilizing a callus debrider to slowly move the layers of dead skin over top of the area. Patient still reports that she is having severe tenderness to the area and is unable to walk adequately without the cam boot.   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 Patient denies any new complaints at this time. Patient denies nausea vomiting shortness breath or chest pain. Patient denies drainage purulence or redness to the area or any other acute signs of infection. The following portions of the patient's history were reviewed and updated as appropriate: allergies, current medications, past family history, past medical history, past social history, past surgical history, and problem list.    Review of Systems   Constitutional: Negative. HENT: Negative. Respiratory: Negative. Genitourinary: Negative. Musculoskeletal: Negative. Skin:  Positive for color change and wound. Objective:      /93 (BP Location: Left arm, Patient Position: Sitting, Cuff Size: Standard)   Pulse 85   Ht 5' 8" (1.727 m)   LMP 11/08/2023 (Exact Date)   SpO2 100%   BMI 33.75 kg/m²          Physical Exam  Cardiovascular:      Pulses:           Dorsalis pedis pulses are 1+ on the right side and 1+ on the left side. Posterior tibial pulses are 1+ on the right side and 1+ on the left side. Musculoskeletal:      Right foot: Normal range of motion. No deformity. Left foot: Normal range of motion. No deformity. Feet:      Right foot:      Protective Sensation: 10 sites tested. 10 sites sensed. Skin integrity: Skin breakdown and callus present. No erythema or warmth. Left foot:      Protective Sensation: 10 sites tested. 10 sites sensed. Skin integrity: Skin integrity normal.      Comments: -There is a small punctate lesion noted to the plantar aspect of the foot with callus formation. Upon debridement there is small grayish metallic fragments noted within the lesion.

## 2023-12-06 RX ORDER — CHLORHEXIDINE GLUCONATE ORAL RINSE 1.2 MG/ML
15 SOLUTION DENTAL ONCE
OUTPATIENT
Start: 2023-12-06 | End: 2023-12-06

## 2023-12-11 DIAGNOSIS — E28.2 PCOS (POLYCYSTIC OVARIAN SYNDROME): Primary | ICD-10-CM

## 2023-12-11 RX ORDER — DESOGESTREL AND ETHINYL ESTRADIOL 0.15-0.03
1 KIT ORAL DAILY
Qty: 28 TABLET | Refills: 5 | Status: SHIPPED | OUTPATIENT
Start: 2023-12-11

## 2023-12-13 DIAGNOSIS — E11.65 TYPE 2 DIABETES MELLITUS WITH HYPERGLYCEMIA, WITHOUT LONG-TERM CURRENT USE OF INSULIN (HCC): ICD-10-CM

## 2023-12-13 DIAGNOSIS — F41.1 GAD (GENERALIZED ANXIETY DISORDER): ICD-10-CM

## 2023-12-13 RX ORDER — SITAGLIPTIN AND METFORMIN HYDROCHLORIDE 1000; 50 MG/1; MG/1
1 TABLET, FILM COATED ORAL 2 TIMES DAILY WITH MEALS
Qty: 180 TABLET | Refills: 0 | Status: SHIPPED | OUTPATIENT
Start: 2023-12-13 | End: 2023-12-22

## 2023-12-13 RX ORDER — CITALOPRAM HYDROBROMIDE 10 MG/1
10 TABLET ORAL DAILY
Qty: 90 TABLET | Refills: 0 | Status: SHIPPED | OUTPATIENT
Start: 2023-12-13

## 2023-12-19 ENCOUNTER — APPOINTMENT (OUTPATIENT)
Dept: LAB | Facility: CLINIC | Age: 23
End: 2023-12-19
Payer: COMMERCIAL

## 2023-12-19 ENCOUNTER — CONSULT (OUTPATIENT)
Dept: INTERNAL MEDICINE CLINIC | Facility: CLINIC | Age: 23
End: 2023-12-19

## 2023-12-19 VITALS
OXYGEN SATURATION: 97 % | BODY MASS INDEX: 33.74 KG/M2 | TEMPERATURE: 97.6 F | WEIGHT: 215 LBS | DIASTOLIC BLOOD PRESSURE: 76 MMHG | RESPIRATION RATE: 16 BRPM | SYSTOLIC BLOOD PRESSURE: 110 MMHG | HEART RATE: 82 BPM | HEIGHT: 67 IN

## 2023-12-19 DIAGNOSIS — S91.331A PUNCTURE WOUND OF RIGHT FOOT, INITIAL ENCOUNTER: ICD-10-CM

## 2023-12-19 DIAGNOSIS — E11.65 TYPE 2 DIABETES MELLITUS WITH HYPERGLYCEMIA, WITHOUT LONG-TERM CURRENT USE OF INSULIN (HCC): ICD-10-CM

## 2023-12-19 DIAGNOSIS — Z01.818 PRE-OPERATIVE CLEARANCE: Primary | ICD-10-CM

## 2023-12-19 DIAGNOSIS — M79.671 PAIN OF RIGHT HEEL: ICD-10-CM

## 2023-12-19 LAB
ALBUMIN SERPL BCP-MCNC: 4.1 G/DL (ref 3.5–5)
ALP SERPL-CCNC: 70 U/L (ref 34–104)
ALT SERPL W P-5'-P-CCNC: 13 U/L (ref 7–52)
ANION GAP SERPL CALCULATED.3IONS-SCNC: 4 MMOL/L
AST SERPL W P-5'-P-CCNC: 11 U/L (ref 13–39)
B-HCG SERPL-ACNC: <1 MIU/ML (ref 0–5)
BASOPHILS # BLD AUTO: 0.06 THOUSANDS/ÂΜL (ref 0–0.1)
BASOPHILS NFR BLD AUTO: 1 % (ref 0–1)
BILIRUB SERPL-MCNC: 0.4 MG/DL (ref 0.2–1)
BUN SERPL-MCNC: 14 MG/DL (ref 5–25)
CALCIUM SERPL-MCNC: 9 MG/DL (ref 8.4–10.2)
CHLORIDE SERPL-SCNC: 105 MMOL/L (ref 96–108)
CHOLEST SERPL-MCNC: 149 MG/DL
CO2 SERPL-SCNC: 31 MMOL/L (ref 21–32)
CREAT SERPL-MCNC: 0.62 MG/DL (ref 0.6–1.3)
CREAT UR-MCNC: 84.1 MG/DL
EOSINOPHIL # BLD AUTO: 0.3 THOUSAND/ÂΜL (ref 0–0.61)
EOSINOPHIL NFR BLD AUTO: 3 % (ref 0–6)
ERYTHROCYTE [DISTWIDTH] IN BLOOD BY AUTOMATED COUNT: 12.7 % (ref 11.6–15.1)
EST. AVERAGE GLUCOSE BLD GHB EST-MCNC: 255 MG/DL
GFR SERPL CREATININE-BSD FRML MDRD: 126 ML/MIN/1.73SQ M
GLUCOSE P FAST SERPL-MCNC: 112 MG/DL (ref 65–99)
HBA1C MFR BLD: 10.5 %
HCT VFR BLD AUTO: 43.4 % (ref 34.8–46.1)
HDLC SERPL-MCNC: 50 MG/DL
HGB BLD-MCNC: 13.8 G/DL (ref 11.5–15.4)
IMM GRANULOCYTES # BLD AUTO: 0.04 THOUSAND/UL (ref 0–0.2)
IMM GRANULOCYTES NFR BLD AUTO: 0 % (ref 0–2)
LDLC SERPL CALC-MCNC: 71 MG/DL (ref 0–100)
LYMPHOCYTES # BLD AUTO: 2.51 THOUSANDS/ÂΜL (ref 0.6–4.47)
LYMPHOCYTES NFR BLD AUTO: 27 % (ref 14–44)
MCH RBC QN AUTO: 28.3 PG (ref 26.8–34.3)
MCHC RBC AUTO-ENTMCNC: 31.8 G/DL (ref 31.4–37.4)
MCV RBC AUTO: 89 FL (ref 82–98)
MICROALBUMIN UR-MCNC: <7 MG/L
MICROALBUMIN/CREAT 24H UR: <8 MG/G CREATININE (ref 0–30)
MONOCYTES # BLD AUTO: 0.61 THOUSAND/ÂΜL (ref 0.17–1.22)
MONOCYTES NFR BLD AUTO: 7 % (ref 4–12)
NEUTROPHILS # BLD AUTO: 5.65 THOUSANDS/ÂΜL (ref 1.85–7.62)
NEUTS SEG NFR BLD AUTO: 62 % (ref 43–75)
NRBC BLD AUTO-RTO: 0 /100 WBCS
PLATELET # BLD AUTO: 290 THOUSANDS/UL (ref 149–390)
PMV BLD AUTO: 9.7 FL (ref 8.9–12.7)
POTASSIUM SERPL-SCNC: 4.3 MMOL/L (ref 3.5–5.3)
PROT SERPL-MCNC: 6.9 G/DL (ref 6.4–8.4)
RBC # BLD AUTO: 4.88 MILLION/UL (ref 3.81–5.12)
SL AMB POCT HEMOGLOBIN AIC: 11 (ref ?–6.5)
SODIUM SERPL-SCNC: 140 MMOL/L (ref 135–147)
TRIGL SERPL-MCNC: 142 MG/DL
WBC # BLD AUTO: 9.17 THOUSAND/UL (ref 4.31–10.16)

## 2023-12-19 PROCEDURE — 80053 COMPREHEN METABOLIC PANEL: CPT

## 2023-12-19 PROCEDURE — 82570 ASSAY OF URINE CREATININE: CPT

## 2023-12-19 PROCEDURE — 85025 COMPLETE CBC W/AUTO DIFF WBC: CPT

## 2023-12-19 PROCEDURE — 84702 CHORIONIC GONADOTROPIN TEST: CPT

## 2023-12-19 PROCEDURE — 36415 COLL VENOUS BLD VENIPUNCTURE: CPT

## 2023-12-19 PROCEDURE — 83036 HEMOGLOBIN GLYCOSYLATED A1C: CPT

## 2023-12-19 PROCEDURE — 80061 LIPID PANEL: CPT

## 2023-12-19 PROCEDURE — 82043 UR ALBUMIN QUANTITATIVE: CPT

## 2023-12-19 NOTE — PROGRESS NOTES
Name: Kimi Santiago      : 2000      MRN: [id number]  Encounter Provider: Britni Ruvalcaba MD  Encounter Date: 2023   Encounter department: [facility] INTERNAL MEDICINE [place]    Assessment & Plan     1. Pre-operative evaluation  Assessment & Plan:  Patient is schedule for right foot wash out with Dr. Keys from podiatry on 23  A1c in office is 11.  Patient remains with uncontrolled diabetes and has not been seen in the office since May 2023.  She also has not completed blood work ordered from  to determine if she has type 1 DM  Her last CMP was   Patient states that she did not know she had blood work to complete.    Discussed with patient that given her high A1c and lack of recent blood work she is not fully optimized for surgery at this time.  Discussed the importance of being compliant and controlling her DM.   We will refer her to endocrinology and try to get her an appointment to be seen this week  Discussed with patient that we will also rely this information with her podiatrist Dr. Keys     Patient reports that she often forgets to check her sugars and has not been doing so for the past two weeks        2. Type 2 diabetes mellitus with hyperglycemia, without long-term current use of insulin (HCC)  -     Hemoglobin A1C; Future  -     Ambulatory Referral to Endocrinology; Future  -     Albumin / creatinine urine ratio; Future  -     Lipid Panel with Direct LDL reflex; Future           Subjective      HPI  Kimi presents for pre op evaluation. She reports that she is scheduled for a wash out surgery with podiatry on 23. She developed a puncture wound to her right foot after stepping on a nail a few weeks ago. Xray on 23 shows remains foreign bodies in her right foot. She denies any chest pain, sob, lightheadedness, history of DVT, blood thinners or recent steroid use. She states that she has not been checking her sugar for the past two weeks  "because she \"forgets to\". She reports that she is compliant with her medication and has been taking Trulicity 0.75 mg once a week and Janumet 50 mg twice a day.  Review of Systems   Constitutional:  Negative for chills and fever.   HENT:  Negative for ear pain and sore throat.    Eyes:  Negative for pain and visual disturbance.   Respiratory:  Negative for cough and shortness of breath.    Cardiovascular:  Negative for chest pain and palpitations.   Gastrointestinal:  Negative for abdominal pain and vomiting.   Genitourinary:  Negative for dysuria and hematuria.   Musculoskeletal:  Negative for arthralgias and back pain.        Right foot pain   Skin:  Negative for color change and rash.   Neurological:  Negative for seizures and syncope.   All other systems reviewed and are negative.      Current Outpatient Medications on File Prior to Visit   Medication Sig    Alcohol Swabs (Alcohol Prep) 70 % PADS Use 2 (two) times a day    Blood Glucose Monitoring Suppl (OneTouch Verio Reflect) w/Device KIT Use 1 Device 3 (three) times a day    citalopram (CeleXA) 10 mg tablet TAKE 1 TABLET BY MOUTH EVERY DAY    clobetasol (TEMOVATE) 0.05 % ointment Apply topically 2 (two) times a day for 7 days    desogestrel-ethinyl estradiol (Enskyce) 0.15-30 MG-MCG per tablet TAKE 1 TABLET BY MOUTH ONCE DAILY    dulaglutide (Trulicity) 0.75 MG/0.5ML injection Inject 0.5 mL (0.75 mg total) under the skin every 7 days    Enskyce 0.15-30 MG-MCG per tablet Take 1 tablet by mouth daily    glucose blood (OneTouch Verio) test strip Use as instructed    Lancets (OneTouch Delica Plus [id number]) MISC Use 1 each 3 (three) times a day    sitaGLIPtin-metFORMIN (Janumet)  MG per tablet TAKE 1 TABLET BY MOUTH TWICE DAILY WITH MEALS    triamcinolone (KENALOG) 0.1 % cream Apply topically 2 (two) times a day    Continuous Blood Gluc  (DeskActiveyle Miguelina 14 Day [place]) KAYLYNN Dispense one reader (Patient not taking: Reported on 4/6/2023)    " "Continuous Blood Gluc Sensor (FreeStyle Miguelina 14 Day Sensor) MISC Use 1 application 3 (three) times a day (Patient not taking: Reported on 4/6/2023)    desogestrel-ethinyl estradiol (Isibloom) 0.15-30 MG-MCG per tablet TAKE 1 TABLET BY MOUTH ONCE DAILY (Patient not taking: Reported on 12/19/2023)    Enskyce 0.15-30 MG-MCG per tablet take 1 tablet by mouth once daily (Patient not taking: Reported on 12/19/2023)       Objective     /76 (BP Location: Left arm, Patient Position: Sitting, Cuff Size: Large)   Pulse 82   Temp 97.6 °F (36.4 °C) (Tympanic)   Resp 16   Ht 5' 7\" (1.702 m)   Wt 97.5 kg (215 lb)   SpO2 97%   BMI 33.67 kg/m²     Physical Exam  Constitutional:       Appearance: She is obese.   HENT:      Head: Normocephalic and atraumatic.      Left Ear: Tympanic membrane normal.   Cardiovascular:      Rate and Rhythm: Normal rate and regular rhythm.      Pulses: Normal pulses.      Heart sounds: Normal heart sounds.   Pulmonary:      Effort: Pulmonary effort is normal.      Breath sounds: Normal breath sounds.   Abdominal:      General: Bowel sounds are normal. There is distension.   Musculoskeletal:         General: Normal range of motion.   Skin:     General: Skin is warm.   Neurological:      General: No focal deficit present.      Mental Status: She is alert and oriented to person, place, and time.   Psychiatric:         Mood and Affect: Mood normal.         Behavior: Behavior normal.       Britni Ruvalcaba MD    "

## 2023-12-19 NOTE — ASSESSMENT & PLAN NOTE
Patient is schedule for right foot wash out with Dr. Keys from podiatry on 12/29/23  A1c in office is 11.  Patient remains with uncontrolled diabetes and has not been seen in the office since May 2023.  She also has not completed blood work ordered from 2022 to determine if she has type 1 DM  Her last CMP was 2022  Patient states that she did not know she had blood work to complete.    Discussed with patient that given her high A1c and lack of recent blood work she is not fully optimized for surgery at this time.  Discussed the importance of being compliant and controlling her DM.   We will refer her to endocrinology and try to get her an appointment to be seen this week  Discussed with patient that we will also rely this information with her podiatrist Dr. Keys     Patient reports that she often forgets to check her sugars and has not been doing so for the past two weeks

## 2023-12-20 ENCOUNTER — TELEPHONE (OUTPATIENT)
Age: 23
End: 2023-12-20

## 2023-12-20 ENCOUNTER — TELEPHONE (OUTPATIENT)
Dept: OBGYN CLINIC | Facility: CLINIC | Age: 23
End: 2023-12-20

## 2023-12-20 ENCOUNTER — TELEPHONE (OUTPATIENT)
Dept: OBGYN CLINIC | Facility: HOSPITAL | Age: 23
End: 2023-12-20

## 2023-12-20 NOTE — TELEPHONE ENCOUNTER
Caller: Kimi Santiago    Doctor/Office: Dr. Gracia/Shon    #: [phone]    Escalation: Surgery/Patient thinks her surgery is 12/29 but in appt desk it says SX is scheduled for March of 2024. Please call pt back and advise as she went to complete some testing for surgery yesterday.  Thanks

## 2023-12-20 NOTE — TELEPHONE ENCOUNTER
Caller: Patient    Doctor/Office: Gracia    Call regarding :  Sx Clearance     Call was transferred to: Podiatry

## 2023-12-20 NOTE — TELEPHONE ENCOUNTER
Attempted to call pt to reschedule surgery with Dr. Gracia due to high A1C and not being cleared by her PCP.  No answer and MB is full-unable to leave a message.  Will try again later.

## 2023-12-22 ENCOUNTER — CONSULT (OUTPATIENT)
Dept: INTERNAL MEDICINE CLINIC | Facility: CLINIC | Age: 23
End: 2023-12-22
Payer: COMMERCIAL

## 2023-12-22 VITALS
BODY MASS INDEX: 33.67 KG/M2 | SYSTOLIC BLOOD PRESSURE: 118 MMHG | RESPIRATION RATE: 16 BRPM | DIASTOLIC BLOOD PRESSURE: 80 MMHG | WEIGHT: 215 LBS | OXYGEN SATURATION: 98 % | TEMPERATURE: 97.6 F | HEART RATE: 87 BPM

## 2023-12-22 DIAGNOSIS — E11.65 TYPE 2 DIABETES MELLITUS WITH HYPERGLYCEMIA, WITHOUT LONG-TERM CURRENT USE OF INSULIN (HCC): Primary | ICD-10-CM

## 2023-12-22 DIAGNOSIS — E28.2 PCOS (POLYCYSTIC OVARIAN SYNDROME): ICD-10-CM

## 2023-12-22 DIAGNOSIS — E11.65 TYPE 2 DIABETES MELLITUS WITH HYPERGLYCEMIA, WITHOUT LONG-TERM CURRENT USE OF INSULIN (HCC): ICD-10-CM

## 2023-12-22 PROCEDURE — 99244 OFF/OP CNSLTJ NEW/EST MOD 40: CPT | Performed by: INTERNAL MEDICINE

## 2023-12-22 RX ORDER — METFORMIN HYDROCHLORIDE 500 MG/1
TABLET, EXTENDED RELEASE ORAL
Qty: 180 TABLET | Refills: 0 | Status: SHIPPED | OUTPATIENT
Start: 2023-12-22 | End: 2024-03-21

## 2023-12-22 RX ORDER — METFORMIN HYDROCHLORIDE 500 MG/1
1000 TABLET, EXTENDED RELEASE ORAL
Qty: 60 TABLET | Refills: 2 | Status: SHIPPED | OUTPATIENT
Start: 2023-12-22 | End: 2023-12-22

## 2023-12-22 NOTE — PROGRESS NOTES
Name: Kimi Santiago      : 2000      MRN: [id number]  Encounter Provider: Lachelle Haynes MD  Encounter Date: 2023   Encounter department: [facility] INTERNAL MEDICINE [place] SPECIALTY    Assessment & Plan     1. Type 2 diabetes mellitus with hyperglycemia, without long-term current use of insulin ([facility])  Assessment & Plan:    Lab Results   Component Value Date    HGBA1C 10.5 (H) 2023     Lab Results   Component Value Date    SODIUM 140 2023    K 4.3 2023     2023    CO2 31 2023    AGAP 4 2023    BUN 14 2023    CREATININE 0.62 2023    GLUC 388 (H) 2019    GLUF 112 (H) 2023    CALCIUM 9.0 2023    AST 11 (L) 2023    ALT 13 2023    ALKPHOS 70 2023    TP 6.9 2023    TBILI 0.40 2023    EGFR 126 2023       Lab Results   Component Value Date    WBC 9.17 2023    HGB 13.8 2023    HCT 43.4 2023    MCV 89 2023     2023                    Instructions as follows:  Stop taking Janumet.   Start taking Metformin  mg 2 tablets daily with dinner.   Increase Trulicity to 1.5 mg from your current dose of 0.75 mg (2 shots of current supply).  Send blood sugars through [facility] in 1 week.     Orders:  -     metFORMIN (GLUCOPHAGE-XR) 500 mg 24 hr tablet; Take 2 tablets (1,000 mg total) by mouth daily with dinner  -     dulaglutide (Trulicity) 1.5 MG/0.5ML injection; Inject 0.5 mL (1.5 mg total) under the skin every 7 days               Subjective      Patient is a 23-year-old female.  We are consulted for type 2 diabetes without insulin use from Dr. Ruvalcaba.  She is here due to having uncontrolled hemoglobin A1c of 10.5 after seeing podiatrist after injury of a nail going through her right foot.  Podiatrist wants to do a surgical procedure, but noted that it will be unable to be done as her hemoglobin A1c is uncontrolled above 9.0.    Patient states that she was  "diagnosed when she was 11 to 12 years old with type 2 diabetes and she was taking metformin at the time.  She stopped following with her endocrinologist or taking any medications when she was 16 due to a tumultuous home life.  She restarted care earlier this year in January and was placed on metformin with which she had significant GI side effects, mainly nausea.  She was then switched to Janumet  twice daily without any side effects, although she was not strictly compliant at taking it in the mornings due to her work schedule.  A few months later, Trulicity was also added to her medication regimen, which she states she was mostly compliant on.  Over the last 4 days, she has continuously checked her blood sugars which she states ranged between 80s to 140s: \"They have always been in the green\".     She states she is inconsistent with her diet as she usually does not have breakfast, has a salad or lunch bolus for lunch, and usually has a consistent dinner nightly.  She states she does not have too many snacks or too much processed sugars in her diet.  She is also not drinking processed sugar drinks, and at most has a Clyde's cup on most days.  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  She also runs almost 2 times a week.    She has a strong family history of diabetes with her mother having gestational diabetes, both her grandmothers having type 2 diabetes, and her great grandfather also having type 2 diabetes.  Her personal past medical history includes PCOS, anxiety, depression.    She denies any significant symptoms with her diabetes.  She denies any numbness or tingling, polyuria, nocturia, polyphagia, polydipsia.  She notes that she has sensation in all her extremities.        Review of Systems   Constitutional:  Negative for appetite change, chills, fatigue and fever.   HENT:  Negative for ear pain and sore throat.    Eyes:  Negative for pain and visual " disturbance.   Respiratory:  Negative for cough, shortness of breath and wheezing.    Cardiovascular:  Negative for chest pain, palpitations and leg swelling.   Gastrointestinal:  Negative for abdominal pain, blood in stool, constipation, diarrhea, nausea and vomiting.   Endocrine: Negative for polydipsia, polyphagia and polyuria.   Genitourinary:  Negative for difficulty urinating, dysuria, hematuria and urgency.   Musculoskeletal:  Negative for arthralgias, back pain and myalgias.   Skin:  Negative for rash and wound.   Neurological:  Negative for seizures, syncope, light-headedness and headaches.   Psychiatric/Behavioral:  Negative for confusion, decreased concentration and sleep disturbance. The patient is not nervous/anxious.    All other systems reviewed and are negative.    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    Depressive disorder 05/15/2014    Eczema 05/16/2017    Vitamin D deficiency 05/21/2017    Cracked skin 05/02/2023    Puncture wound of right foot, initial encounter 11/17/2023    Pain of right heel 11/17/2023    Achilles tendinitis, right leg 11/17/2023    Pre-operative evaluation 12/19/2023     Resolved Ambulatory Problems     Diagnosis Date Noted    No Resolved Ambulatory Problems     Past Medical History:   Diagnosis Date    Anxiety     Depression     Diabetes mellitus ([facility])     Penetrating foot wound          Current Outpatient Medications on File Prior to Visit   Medication Sig    Alcohol Swabs (Alcohol Prep) 70 % PADS Use 2 (two)  times a day    citalopram (CeleXA) 10 mg tablet TAKE 1 TABLET BY MOUTH EVERY DAY    clobetasol (TEMOVATE) 0.05 % ointment Apply topically 2 (two) times a day for 7 days (Patient taking differently: Apply topically 2 (two) times a day 12/19/23 Per pt using as needed only)    desogestrel-ethinyl estradiol (Enskyce) 0.15-30 MG-MCG per tablet TAKE 1 TABLET BY MOUTH ONCE DAILY (Patient taking differently: Take 1 tablet by mouth daily at bedtime)    desogestrel-ethinyl estradiol (Isibloom) 0.15-30 MG-MCG per tablet TAKE 1 TABLET BY MOUTH ONCE DAILY    Enskyce 0.15-30 MG-MCG per tablet take 1 tablet by mouth once daily    glucose blood (OneTouch Verio) test strip Use as instructed    Lancets (OneTouch Delica Plus [id number]) MISC Use 1 each 3 (three) times a day    triamcinolone (KENALOG) 0.1 % cream Apply topically 2 (two) times a day (Patient taking differently: Apply topically 2 (two) times a day 12/19/23 Per pt using as needed)    [DISCONTINUED] dulaglutide (Trulicity) 0.75 MG/0.5ML injection Inject 0.5 mL (0.75 mg total) under the skin every 7 days (Patient taking differently: Inject 0.75 mg under the skin every 7 days Takes on a Tuesday - reports last dose taken 12/19/23)    [DISCONTINUED] sitaGLIPtin-metFORMIN (Janumet)  MG per tablet TAKE 1 TABLET BY MOUTH TWICE DAILY WITH MEALS    Blood Glucose Monitoring Suppl (OneTouch Verio Reflect) w/Device KIT Use 1 Device 3 (three) times a day (Patient taking differently: Use 1 Device 3 (three) times a day 12/19/23 per pt tests once a day typically)    Continuous Blood Gluc  (FreeStyle Miguelina 14 Day [place]) KAYLYNN Dispense one reader (Patient not taking: Reported on 12/22/2023)    Continuous Blood Gluc Sensor (FreeStyle Miguelina 14 Day Sensor) MISC Use 1 application 3 (three) times a day (Patient not taking: Reported on 4/6/2023)    Enskyce 0.15-30 MG-MCG per tablet Take 1 tablet by mouth daily (Patient not taking: Reported on 12/22/2023)       Objective     BP  118/80 (BP Location: Left arm, Patient Position: Sitting, Cuff Size: Large)   Pulse 87   Temp 97.6 °F (36.4 °C) (Tympanic)   Resp 16   Wt 97.5 kg (215 lb)   LMP 12/09/2023 (Exact Date)   SpO2 98%   BMI 33.67 kg/m²     Physical Exam  Vitals and nursing note reviewed.   Constitutional:       General: She is not in acute distress.     Appearance: Normal appearance. She is obese. She is not ill-appearing, toxic-appearing or diaphoretic.   HENT:      Head: Normocephalic and atraumatic.      Mouth/Throat:      Mouth: Mucous membranes are moist.   Eyes:      General: No scleral icterus.     Extraocular Movements: Extraocular movements intact.      Conjunctiva/sclera: Conjunctivae normal.   Cardiovascular:      Rate and Rhythm: Normal rate and regular rhythm.      Pulses: Normal pulses.      Heart sounds: Normal heart sounds. No murmur heard.     No friction rub. No gallop.   Pulmonary:      Effort: Pulmonary effort is normal. No respiratory distress.      Breath sounds: Normal breath sounds. No wheezing, rhonchi or rales.   Chest:      Chest wall: No tenderness.   Abdominal:      General: Bowel sounds are normal. There is no distension.      Tenderness: There is no abdominal tenderness. There is no right CVA tenderness, left CVA tenderness, guarding or rebound.   Musculoskeletal:         General: Signs of injury (Right foot in boot) present. No swelling or tenderness. Normal range of motion.      Cervical back: Normal range of motion.      Right lower leg: No edema.      Left lower leg: No edema.   Skin:     General: Skin is warm and dry.      Coloration: Skin is not jaundiced.   Neurological:      General: No focal deficit present.      Mental Status: She is alert and oriented to person, place, and time.      Sensory: No sensory deficit.   Psychiatric:         Mood and Affect: Mood normal.         Behavior: Behavior normal.         Thought Content: Thought content normal.         Judgment: Judgment normal.        Lachelle Haynes MD

## 2023-12-22 NOTE — PATIENT INSTRUCTIONS
Stop taking Janumet.   Start taking Metformin  mg 2 tablets daily with dinner.   Increase Trulicity to 1.5 mg from your current dose of 0.75 mg (2 shots of current supply).  Send blood sugars through [facility] in 1 week.

## 2023-12-22 NOTE — ASSESSMENT & PLAN NOTE
Lab Results   Component Value Date    HGBA1C 10.5 (H) 12/19/2023     Lab Results   Component Value Date    SODIUM 140 12/19/2023    K 4.3 12/19/2023     12/19/2023    CO2 31 12/19/2023    AGAP 4 12/19/2023    BUN 14 12/19/2023    CREATININE 0.62 12/19/2023    GLUC 388 (H) 05/14/2019    GLUF 112 (H) 12/19/2023    CALCIUM 9.0 12/19/2023    AST 11 (L) 12/19/2023    ALT 13 12/19/2023    ALKPHOS 70 12/19/2023    TP 6.9 12/19/2023    TBILI 0.40 12/19/2023    EGFR 126 12/19/2023       Lab Results   Component Value Date    WBC 9.17 12/19/2023    HGB 13.8 12/19/2023    HCT 43.4 12/19/2023    MCV 89 12/19/2023     12/19/2023                    Instructions as follows:  Stop taking Janumet.   Start taking Metformin  mg 2 tablets daily with dinner.   Increase Trulicity to 1.5 mg from your current dose of 0.75 mg (2 shots of current supply).  Send blood sugars through [employer] in 1 week.

## 2024-01-02 ENCOUNTER — OFFICE VISIT (OUTPATIENT)
Dept: PODIATRY | Facility: CLINIC | Age: 24
End: 2024-01-02
Payer: COMMERCIAL

## 2024-01-02 ENCOUNTER — HOSPITAL ENCOUNTER (OUTPATIENT)
Dept: RADIOLOGY | Facility: HOSPITAL | Age: 24
Discharge: HOME | End: 2024-01-02
Attending: PODIATRIST
Payer: COMMERCIAL

## 2024-01-02 VITALS
HEIGHT: 67 IN | BODY MASS INDEX: 33.67 KG/M2 | OXYGEN SATURATION: 99 % | HEART RATE: 88 BPM | SYSTOLIC BLOOD PRESSURE: 145 MMHG | DIASTOLIC BLOOD PRESSURE: 90 MMHG

## 2024-01-02 DIAGNOSIS — S91.331A PUNCTURE WOUND OF RIGHT FOOT, INITIAL ENCOUNTER: ICD-10-CM

## 2024-01-02 DIAGNOSIS — S91.331A PUNCTURE WOUND OF RIGHT FOOT, INITIAL ENCOUNTER: Primary | ICD-10-CM

## 2024-01-02 DIAGNOSIS — E11.65 TYPE 2 DIABETES MELLITUS WITH HYPERGLYCEMIA, WITHOUT LONG-TERM CURRENT USE OF INSULIN (HCC): ICD-10-CM

## 2024-01-02 DIAGNOSIS — M79.5 FOREIGN BODY (FB) IN SOFT TISSUE: ICD-10-CM

## 2024-01-02 PROCEDURE — 73630 X-RAY EXAM OF FOOT: CPT

## 2024-01-02 PROCEDURE — 99213 OFFICE O/P EST LOW 20 MIN: CPT | Performed by: PODIATRIST

## 2024-01-02 NOTE — LETTER
January 2, 2024     Patient: Kimi Santiago  YOB: 2000  Date of Visit: 1/2/2024      To Whom it May Concern:    Kimi Santiago is under my professional care. Kimi was seen in my office on 1/2/2024. Kimi may return to work on 1/3/24  without restrictions.    If you have any questions or concerns, please don't hesitate to call.         Sincerely,          Bryan Gracia DPM        CC: No Recipients

## 2024-01-02 NOTE — PROGRESS NOTES
Assessment/Plan:     The patient's clinical examination today is relatively benign.  The foreign body reaction to the plantar aspect of the right heel has resolved.  There is mild callus formation.  There is no erythema nor edema no calor or ecchymosis noted to the plantar right heel.  There is no tenderness palpation nor with lateral squeeze of the portal of entry site for the foreign body of the right heel.    Repeat x-rays of the patient's right foot taken today shows resolution of the retained foreign body to the plantar right heel.  This work is compared to her prior films from December 5, 2023.    The patient seems to be doing well from a clinical standpoint.  The plantar callus to the right heel was gently debrided with a sterile #15 blade.  There is return of skin lines without any signs of infection.  There is no erythema nor edema nor open lesions to the plantar aspect of the right heel.  There is no foreign body reaction noted.  She notes resolution of the pain to the plantar aspect of her right heel.  We will continue to monitor the site for any recurrent foreign body reaction.  Recommend follow-up in 3 to 4 weeks for another set of x-rays of the right heel.     Diagnoses and all orders for this visit:    Puncture wound of right foot, initial encounter  -     X-ray foot right 3+ views; Future    Foreign body (FB) in soft tissue    Type 2 diabetes mellitus with hyperglycemia, without long-term current use of insulin (HCC)          Subjective:     Patient ID: Kimi Santiago is a 23 y.o. female.    The patient presents today for follow-up of a retained foreign body to the plantar aspect of her right heel.  Recent plans for surgical intervention for excision of the retained foreign body was canceled due to the patient's poorly controlled diabetes.  Since that time, she has been padding the heel with a dispersion pad, and she also notes interval resolution of pain and swelling to the area.      PAST  MEDICAL HISTORY:  Past Medical History:   Diagnosis Date    Anxiety     Depression     Diabetes mellitus (HCC)     Type 2    PCOS (polycystic ovarian syndrome)     Penetrating foot wound     Right foot       PAST SURGICAL HISTORY:  Past Surgical History:   Procedure Laterality Date    WISDOM TOOTH EXTRACTION          ALLERGIES:  Patient has no known allergies.    MEDICATIONS:  Current Outpatient Medications   Medication Sig Dispense Refill    Alcohol Swabs (Alcohol Prep) 70 % PADS Use 2 (two) times a day 90 each 0    Blood Glucose Monitoring Suppl (OneTouch Verio Reflect) w/Device KIT Use 1 Device 3 (three) times a day (Patient taking differently: Use 1 Device 3 (three) times a day 12/19/23 per pt tests once a day typically) 1 kit 0    citalopram (CeleXA) 10 mg tablet TAKE 1 TABLET BY MOUTH EVERY DAY 90 tablet 0    desogestrel-ethinyl estradiol (Enskyce) 0.15-30 MG-MCG per tablet TAKE 1 TABLET BY MOUTH ONCE DAILY (Patient taking differently: Take 1 tablet by mouth daily at bedtime) 28 tablet 5    desogestrel-ethinyl estradiol (Isibloom) 0.15-30 MG-MCG per tablet TAKE 1 TABLET BY MOUTH ONCE DAILY 28 tablet 5    dulaglutide (Trulicity) 1.5 MG/0.5ML injection Inject 0.5 mL (1.5 mg total) under the skin every 7 days 2 mL 3    Enskyce 0.15-30 MG-MCG per tablet take 1 tablet by mouth once daily 28 tablet 2    glucose blood (OneTouch Verio) test strip Use as instructed 100 strip 5    metFORMIN (GLUCOPHAGE-XR) 500 mg 24 hr tablet TAKE 2 TABLETS(1000 MG) BY MOUTH DAILY WITH DINNER 180 tablet 0    triamcinolone (KENALOG) 0.1 % cream Apply topically 2 (two) times a day (Patient taking differently: Apply topically 2 (two) times a day 12/19/23 Per pt using as needed) 30 g 0    clobetasol (TEMOVATE) 0.05 % ointment Apply topically 2 (two) times a day for 7 days (Patient taking differently: Apply topically 2 (two) times a day 12/19/23 Per pt using as needed only) 15 g 0    Enskyce 0.15-30 MG-MCG per tablet Take 1 tablet by mouth  daily (Patient not taking: Reported on 12/22/2023)      Lancets (OneTouch Delica Plus [id number]) MISC Use 1 each 3 (three) times a day 100 each 5     No current facility-administered medications for this visit.       SOCIAL HISTORY:  Social History     Socioeconomic History    Marital status: Single     Spouse name: None    Number of children: None    Years of education: None    Highest education level: None   Occupational History    None   Tobacco Use    Smoking status: Never    Smokeless tobacco: Never    Tobacco comments:     Denies any use per pt    Vaping Use    Vaping status: Never Used   Substance and Sexual Activity    Alcohol use: Yes     Comment: socially- couple times a month    Drug use: Yes     Types: Marijuana     Comment: every day    Sexual activity: Yes     Partners: Male     Birth control/protection: OCP     Comment: Denies any chest pain or shortness of breath with activity   Other Topics Concern    None   Social History Narrative    None     Social Determinants of Health     Financial Resource Strain: Not on file   Food Insecurity: Not on file   Transportation Needs: Not on file   Physical Activity: Not on file   Stress: Not on file   Social Connections: Not on file   Intimate Partner Violence: Not on file   Housing Stability: Not on file        Review of Systems   Constitutional: Negative.    HENT: Negative.     Eyes: Negative.    Respiratory: Negative.     Cardiovascular: Negative.    Endocrine: Negative.    Musculoskeletal: Negative.    Neurological: Negative.    Hematological: Negative.    Psychiatric/Behavioral: Negative.           Objective:     Physical Exam  Constitutional:       Appearance: Normal appearance.   HENT:      Head: Normocephalic and atraumatic.      Nose: Nose normal.   Cardiovascular:      Pulses:           Dorsalis pedis pulses are 2+ on the right side.        Posterior tibial pulses are 2+ on the right side.   Pulmonary:      Effort: Pulmonary effort is normal.   Feet:       Right foot:      Skin integrity: Skin integrity normal.      Comments: The patient's clinical examination today is relatively benign.  The foreign body reaction to the plantar aspect of the right heel has resolved.  There is no erythema nor edema no calor or ecchymosis noted to the plantar right heel.  There is no tenderness palpation nor with lateral squeeze of the portal of entry site for the foreign body of the right heel.  Skin:     General: Skin is warm.      Capillary Refill: Capillary refill takes less than 2 seconds.   Neurological:      General: No focal deficit present.      Mental Status: She is alert and oriented to person, place, and time.   Psychiatric:         Mood and Affect: Mood normal.         Behavior: Behavior normal.         Thought Content: Thought content normal.

## 2024-01-24 DIAGNOSIS — S91.331A PUNCTURE WOUND OF RIGHT FOOT, INITIAL ENCOUNTER: Primary | ICD-10-CM

## 2024-02-11 ENCOUNTER — HOSPITAL ENCOUNTER (EMERGENCY)
Facility: HOSPITAL | Age: 24
Discharge: HOME | End: 2024-02-11
Attending: EMERGENCY MEDICINE
Payer: COMMERCIAL

## 2024-02-11 VITALS
RESPIRATION RATE: 20 BRPM | DIASTOLIC BLOOD PRESSURE: 91 MMHG | WEIGHT: 207 LBS | BODY MASS INDEX: 31.37 KG/M2 | OXYGEN SATURATION: 100 % | HEIGHT: 68 IN | SYSTOLIC BLOOD PRESSURE: 140 MMHG | TEMPERATURE: 98.3 F | HEART RATE: 78 BPM

## 2024-02-11 DIAGNOSIS — J30.2 SEASONAL ALLERGIES: ICD-10-CM

## 2024-02-11 DIAGNOSIS — J06.9 VIRAL URI: ICD-10-CM

## 2024-02-11 DIAGNOSIS — J34.89 RHINORRHEA: Primary | ICD-10-CM

## 2024-02-11 LAB
EXT PREGNANCY TEST URINE: NEGATIVE
EXT. CONTROL: NORMAL
FLUAV RNA RESP QL NAA+PROBE: NEGATIVE
FLUBV RNA RESP QL NAA+PROBE: NEGATIVE
RSV RNA RESP QL NAA+PROBE: NEGATIVE
SARS-COV-2 RNA RESP QL NAA+PROBE: NEGATIVE

## 2024-02-11 PROCEDURE — 81025 URINE PREGNANCY TEST: CPT | Performed by: EMERGENCY MEDICINE

## 2024-02-11 PROCEDURE — 99285 EMERGENCY DEPT VISIT HI MDM: CPT | Performed by: EMERGENCY MEDICINE

## 2024-02-11 PROCEDURE — 99283 EMERGENCY DEPT VISIT LOW MDM: CPT

## 2024-02-11 PROCEDURE — 0241U HB NFCT DS VIR RESP RNA 4 TRGT: CPT | Performed by: EMERGENCY MEDICINE

## 2024-02-11 RX ORDER — CETIRIZINE HYDROCHLORIDE 10 MG/1
10 TABLET ORAL DAILY
Qty: 30 TABLET | Refills: 0 | Status: SHIPPED | OUTPATIENT
Start: 2024-02-11 | End: 2024-03-12

## 2024-02-11 NOTE — Clinical Note
Kimi Santiago was seen and treated in our emergency department on 2/11/2024.    No restrictions    Other - See Comments    None    Diagnosis: Rhinorrhea, viral URI versus seasonal allergies    Kimi  may return to work on return date, is off the rest of the shift today.    She may return on this date: 02/13/2024    Viral testing for COVID/flu/RSV sent and pending.  If negative, can return to work sooner if symptoms are improving and fever free.     If you have any questions or concerns, please don't hesitate to call.      Rodrigo Deluca MD    ______________________________           _______________          _______________  Hospital Representative                              Date                                Time

## 2024-02-11 NOTE — ED PROVIDER NOTES
History  Chief Complaint   Patient presents with    Cold Like Symptoms     Patient stating feeling crummy with sore throat, runny nose, nausea, denies fevers/chills/SOB. Patient stating period late for 3 days now. Normally regular     HPI    Prior to Admission Medications   Prescriptions Last Dose Informant Patient Reported? Taking?   Alcohol Swabs (Alcohol Prep) 70 % PADS  Self No No   Sig: Use 2 (two) times a day   Blood Glucose Monitoring Suppl (OneTouch Verio Reflect) w/Device KIT  Self No No   Sig: Use 1 Device 3 (three) times a day   Patient taking differently: Use 1 Device 3 (three) times a day 12/19/23 per pt tests once a day typically   Enskyce 0.15-30 MG-MCG per tablet  Self Yes No   Sig: Take 1 tablet by mouth daily   Patient not taking: Reported on 12/22/2023   Enskyce 0.15-30 MG-MCG per tablet  Self No No   Sig: take 1 tablet by mouth once daily   Lancets (OneTouch Delica Plus [id number]) MISC  Self No No   Sig: Use 1 each 3 (three) times a day   citalopram (CeleXA) 10 mg tablet  Self No No   Sig: TAKE 1 TABLET BY MOUTH EVERY DAY   clobetasol (TEMOVATE) 0.05 % ointment   No No   Sig: Apply topically 2 (two) times a day for 7 days   Patient taking differently: Apply topically 2 (two) times a day 12/19/23 Per pt using as needed only   desogestrel-ethinyl estradiol (Enskyce) 0.15-30 MG-MCG per tablet  Self No No   Sig: TAKE 1 TABLET BY MOUTH ONCE DAILY   Patient taking differently: Take 1 tablet by mouth daily at bedtime   desogestrel-ethinyl estradiol (Isibloom) 0.15-30 MG-MCG per tablet  Self No No   Sig: TAKE 1 TABLET BY MOUTH ONCE DAILY   dulaglutide (Trulicity) 1.5 MG/0.5ML injection  Self No No   Sig: Inject 0.5 mL (1.5 mg total) under the skin every 7 days   glucose blood (OneTouch Verio) test strip  Self No No   Sig: Use as instructed   metFORMIN (GLUCOPHAGE-XR) 500 mg 24 hr tablet  Self No No   Sig: TAKE 2 TABLETS(1000 MG) BY MOUTH DAILY WITH DINNER   triamcinolone (KENALOG) 0.1 % cream  Self No No    Sig: Apply topically 2 (two) times a day   Patient taking differently: Apply topically 2 (two) times a day 12/19/23 Per pt using as needed      Facility-Administered Medications: None       Past Medical History:   Diagnosis Date    Anxiety     Depression     Diabetes mellitus (HCC)     Type 2    PCOS (polycystic ovarian syndrome)     Penetrating foot wound     Right foot       Past Surgical History:   Procedure Laterality Date    WISDOM TOOTH EXTRACTION         Family History   Problem Relation Age of Onset    Diabetes Mother     Diabetes Paternal Grandmother     Diabetes Paternal Grandfather     Anesthesia problems Neg Hx      I have reviewed and agree with the history as documented.    E-Cigarette/Vaping    E-Cigarette Use Never User     Comments Denies any use per pt      E-Cigarette/Vaping Substances     Social History     Tobacco Use    Smoking status: Never    Smokeless tobacco: Never    Tobacco comments:     Denies any use per pt    Vaping Use    Vaping status: Never Used   Substance Use Topics    Alcohol use: Yes     Comment: socially- couple times a month    Drug use: Yes     Types: Marijuana     Comment: every day       Review of Systems    Physical Exam  Physical Exam    Vital Signs  ED Triage Vitals [02/11/24 0533]   Temperature Pulse Respirations Blood Pressure SpO2   98.3 °F (36.8 °C) 78 20 140/91 100 %      Temp Source Heart Rate Source Patient Position - Orthostatic VS BP Location FiO2 (%)   Oral Monitor Sitting Left arm --      Pain Score       No Pain           Vitals:    02/11/24 0533   BP: 140/91   Pulse: 78   Patient Position - Orthostatic VS: Sitting         Visual Acuity      ED Medications  Medications - No data to display    Diagnostic Studies  Results Reviewed       Procedure Component Value Units Date/Time    FLU/RSV/COVID - if FLU/RSV clinically relevant [[id number]] Collected: 02/11/24 0541    Lab Status: In process Specimen: Nares from Nasopharyngeal Swab Updated: 02/11/24 0551    POCT  pregnancy, urine [[id number]]  (Normal) Resulted: 02/11/24 0547    Lab Status: Final result Updated: 02/11/24 0548     EXT Preg Test, Ur Negative     Control Valid                   No orders to display              Procedures  Procedures         ED Course                               SBIRT 20yo+      Flowsheet Row Most Recent Value   Initial Alcohol Screen: US AUDIT-C     1. How often do you have a drink containing alcohol? 0 Filed at: 02/11/2024 0536   2. How many drinks containing alcohol do you have on a typical day you are drinking?  0 Filed at: 02/11/2024 0536   3b. FEMALE Any Age, or MALE 65+: How often do you have 4 or more drinks on one occassion? 0 Filed at: 02/11/2024 0536   Audit-C Score 0 Filed at: 02/11/2024 0536   DIYA: How many times in the past year have you...    Used an illegal drug or used a prescription medication for non-medical reasons? Never Filed at: 02/11/2024 0536                      Medical Decision Making  Amount and/or Complexity of Data Reviewed  Labs: ordered.    Risk  OTC drugs.             Disposition  Final diagnoses:   Rhinorrhea   Viral URI   Seasonal allergies     Time reflects when diagnosis was documented in both MDM as applicable and the Disposition within this note       Time User Action Codes Description Comment    2/11/2024  6:11 AM Rodrigo Deluca [J34.89] Rhinorrhea     2/11/2024  6:11 AM Rodrigo Deluca [J06.9] Viral URI     2/11/2024  6:11 AM Rodrigo Deluca [J30.2] Seasonal allergies           ED Disposition       ED Disposition   Discharge    Condition   Stable    Date/Time   Sun Feb 11, 2024 0611    Comment   Kimi Santiago discharge to home/self care.                   Follow-up Information       Follow up With Specialties Details Why Contact Info Additional Information    [facility] Internal [facility] Internal Medicine Call in 1 week For follow-up [address] [facility] [address] [phone] [facility] Internal Medicine  [place], [address] [place], [address] [phone]    [facility] Emergency Department Emergency Medicine Go to  If symptoms worsen [address] [address] [phone] [facility] Emergency Department, [address] [address]            Patient's Medications   Discharge Prescriptions    CETIRIZINE (ZYRTEC) 10 MG TABLET    Take 1 tablet (10 mg total) by mouth daily       Start Date: 2/11/2024 End Date: 3/12/2024       Order Dose: 10 mg       Quantity: 30 tablet    Refills: 0       No discharge procedures on file.    PDMP Review       None            ED Provider  Electronically Signed by           exist justifying the authorization of the  emergency use of an in vitro diagnostic tests for detection of SARS-CoV-2  virus and/or diagnosis of COVID-19 infection under section 564(b)(1) of  the Act, 21 U.S.C. 360bbb-3(b)(1), unless the authorization is terminated  or revoked sooner. The test has been validated but independent review by FDA  and CLIA is pending.    Test performed using [employer] GeneXpert: This RT-PCR assay targets N2,  a region unique to SARS-CoV-2. A conserved region in the E-gene was chosen  for pan-Sarbecovirus detection which includes SARS-CoV-2.    According to CMS-2020-01-R, this platform meets the definition of high-throughput technology.    POCT pregnancy, urine [[id number]]  (Normal) Resulted: 02/11/24 0547    Lab Status: Final result Updated: 02/11/24 0548     EXT Preg Test, Ur Negative     Control Valid                   No orders to display              Procedures  Procedures         ED Course                               SBIRT 22yo+      Flowsheet Row Most Recent Value   Initial Alcohol Screen: US AUDIT-C     1. How often do you have a drink containing alcohol? 0 Filed at: 02/11/2024 0536   2. How many drinks containing alcohol do you have on a typical day you are drinking?  0 Filed at: 02/11/2024 0536   3b. FEMALE Any Age, or MALE 65+: How often do you have 4 or more drinks on one occassion? 0 Filed at: 02/11/2024 0536   Audit-C Score 0 Filed at: 02/11/2024 0536   DIYA: How many times in the past year have you...    Used an illegal drug or used a prescription medication for non-medical reasons? Never Filed at: 02/11/2024 0536                      Medical Decision Making  23-year-old female with history of diabetes and PCOS presents to the ED for evaluation of viral URI symptoms over the last 3 days.  The patient reports symptoms of sinus congestion, rhinorrhea, sore throat, and nausea.  She notes multiple sick contacts at work.  She denies any documented fevers, chills, chest pain,  dyspnea, abdominal pain, vomiting, diarrhea, or urinary symptoms.    Vital signs reviewed.  See physical exam documentation for exam findings.  Differential diagnosis includes but is not limited to COVID-19, influenza, other viral URI, and/or seasonal allergies.  Will evaluate with testing for COVID-19/influenza/RSV and the patient is also requesting pregnancy testing.  Pregnancy testing negative.  The patient states that she has access to [employer] and is available to follow-up on her viral testing results electronically, preferring discharge so she can go home and sleep after her night shift. I discussed all findings, treatment, red flags/return precautions, and outpatient follow-up and the patient/family understand and agree. Stable for discharge.    Amount and/or Complexity of Data Reviewed  Labs: ordered.    Risk  OTC drugs.             Disposition  Final diagnoses:   Rhinorrhea   Viral URI   Seasonal allergies     Time reflects when diagnosis was documented in both MDM as applicable and the Disposition within this note       Time User Action Codes Description Comment    2/11/2024  6:11 AM Rodrigo Deluca [J34.89] Rhinorrhea     2/11/2024  6:11 AM Rodrigo Deluca [J06.9] Viral URI     2/11/2024  6:11 AM Rodrigo Deluca [J30.2] Seasonal allergies           ED Disposition       ED Disposition   Discharge    Condition   Stable    Date/Time   Sun Feb 11, 2024 0611    Comment   Kimi Santiago discharge to home/self care.                   Follow-up Information       Follow up With Specialties Details Why Contact Info Additional Information    [facility] Internal [facility] Internal Medicine Call in 1 week For follow-up [address] [facility] [address] [phone] [facility] Internal Medicine [place], [address] [place], [address] [phone]    [facility] Emergency Department Emergency Medicine Go to  If symptoms worsen [address] [facility]  [place] [address]  [phone] [facility] Emergency Department, [address] [place] [id number]            Discharge Medication List as of 2/11/2024  6:13 AM        START taking these medications    Details   cetirizine (ZyrTEC) 10 mg tablet Take 1 tablet (10 mg total) by mouth daily, Starting Sun 2/11/2024, Until Tue 3/12/2024, Normal           CONTINUE these medications which have NOT CHANGED    Details   Alcohol Swabs (Alcohol Prep) 70 % PADS Use 2 (two) times a day, Starting Tue 10/17/2023, Normal      Blood Glucose Monitoring Suppl (OneTouch Verio Reflect) w/Device KIT Use 1 Device 3 (three) times a day, Starting Mon 5/22/2023, Normal      citalopram (CeleXA) 10 mg tablet TAKE 1 TABLET BY MOUTH EVERY DAY, Starting Wed 12/13/2023, Normal      clobetasol (TEMOVATE) 0.05 % ointment Apply topically 2 (two) times a day for 7 days, Starting Thu 4/6/2023, Until Fri 12/22/2023, Normal      !! desogestrel-ethinyl estradiol (Enskyce) 0.15-30 MG-MCG per tablet TAKE 1 TABLET BY MOUTH ONCE DAILY, Starting Wed 10/11/2023, Normal      !! desogestrel-ethinyl estradiol (Isibloom) 0.15-30 MG-MCG per tablet TAKE 1 TABLET BY MOUTH ONCE DAILY, Starting Mon 12/11/2023, Normal      dulaglutide (Trulicity) 1.5 MG/0.5ML injection Inject 0.5 mL (1.5 mg total) under the skin every 7 days, Starting Fri 12/22/2023, Normal      !! Enskyce 0.15-30 MG-MCG per tablet Take 1 tablet by mouth daily, Starting Fri 11/18/2022, Historical Med      !! Enskyce 0.15-30 MG-MCG per tablet take 1 tablet by mouth once daily, Starting Mon 8/14/2023, Normal      glucose blood (OneTouch Verio) test strip Use as instructed, Normal      Lancets (OneTouch Delica Plus [id number]) MISC Use 1 each 3 (three) times a day, Starting Tue 5/2/2023, Normal      metFORMIN (GLUCOPHAGE-XR) 500 mg 24 hr tablet TAKE 2 TABLETS(1000 MG) BY MOUTH DAILY WITH DINNER, Normal      triamcinolone (KENALOG) 0.1 % cream Apply topically 2 (two) times a day,  Starting Tue 5/23/2023, Normal       !! - Potential duplicate medications found. Please discuss with provider.          No discharge procedures on file.    PDMP Review       None            ED Provider  Electronically Signed by             Rodrigo Deluca MD  02/29/24 0713

## 2024-03-05 ENCOUNTER — HOSPITAL ENCOUNTER (EMERGENCY)
Facility: HOSPITAL | Age: 24
Discharge: HOME | End: 2024-03-05
Attending: EMERGENCY MEDICINE
Payer: COMMERCIAL

## 2024-03-05 VITALS
TEMPERATURE: 97.6 F | DIASTOLIC BLOOD PRESSURE: 93 MMHG | RESPIRATION RATE: 22 BRPM | HEART RATE: 72 BPM | OXYGEN SATURATION: 100 % | SYSTOLIC BLOOD PRESSURE: 165 MMHG

## 2024-03-05 DIAGNOSIS — S00.83XA CONTUSION OF CHIN, INITIAL ENCOUNTER: ICD-10-CM

## 2024-03-05 DIAGNOSIS — S00.83XA TRAUMATIC ECCHYMOSIS OF CHIN, INITIAL ENCOUNTER: ICD-10-CM

## 2024-03-05 DIAGNOSIS — T74.91XA DOMESTIC VIOLENCE OF ADULT, INITIAL ENCOUNTER: ICD-10-CM

## 2024-03-05 DIAGNOSIS — B00.1 COLD SORE: Primary | ICD-10-CM
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
## 2024-03-05 PROCEDURE — 99284 EMERGENCY DEPT VISIT MOD MDM: CPT

## 2024-03-05 PROCEDURE — 99283 EMERGENCY DEPT VISIT LOW MDM: CPT | Performed by: EMERGENCY MEDICINE

## 2024-03-05 NOTE — Clinical Note
Kimi Santiago was seen and treated in our emergency department on 3/5/2024.    No restrictions    Other - See Comments        Diagnosis: Jason Bolden  is off the rest of the shift today, may return to work on return date.    She may return on this date: 03/06/2024         If you have any questions or concerns, please don't hesitate to call.      Rodrigo Deluca MD    ______________________________           _______________          _______________  Hospital Representative                              Date                                Time

## 2024-03-05 NOTE — ED NOTES
RN encouraged pt to contact PD And or turning point, Patient declined any PD/ turning involvement at this time.      Joaquina Bean RN  03/05/24 2737
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
## 2024-03-19 DIAGNOSIS — E11.65 TYPE 2 DIABETES MELLITUS WITH HYPERGLYCEMIA, WITHOUT LONG-TERM CURRENT USE OF INSULIN (HCC): ICD-10-CM

## 2024-03-19 DIAGNOSIS — F41.1 GAD (GENERALIZED ANXIETY DISORDER): ICD-10-CM

## 2024-03-19 RX ORDER — CITALOPRAM HYDROBROMIDE 10 MG/1
10 TABLET ORAL DAILY
Qty: 90 TABLET | Refills: 0 | Status: SHIPPED | OUTPATIENT
Start: 2024-03-19

## 2024-03-20 ENCOUNTER — TELEPHONE (OUTPATIENT)
Dept: INTERNAL MEDICINE CLINIC | Facility: CLINIC | Age: 24
End: 2024-03-20

## 2024-03-20 RX ORDER — METFORMIN HYDROCHLORIDE 500 MG/1
TABLET, EXTENDED RELEASE ORAL
Qty: 180 TABLET | Refills: 0 | Status: SHIPPED | OUTPATIENT
Start: 2024-03-20 | End: 2024-06-18

## 2024-03-20 NOTE — TELEPHONE ENCOUNTER
Patient requested med refill and  ehas not been in to see endo since December. I have called her to set up a follow up appointment

## 2024-03-20 NOTE — TELEPHONE ENCOUNTER
Metformin RX approved, but it looks like she is overdue to see Dr. Haynes  It looks like she is typically seen [facility]-- please schedule or forward to clinic scheduling.

## 2024-04-02 NOTE — PROGRESS NOTES
Assessment/Plan   Diagnoses and all orders for this visit:    Encounter for gynecological examination (general) (routine) with abnormal findings  -     Thinprep Tis Pap  The current ASCCP guidelines were reviewed. Patient's last pap was 11/17/20 - WNL and therefore, a pap is indicated at this time. I emphasized the importance of an annual pelvic and breast exam. Patient ok to have a pap done today.    Screening for STD (sexually transmitted disease)  -     Hepatitis B surface antigen; Future  -     Hepatitis C antibody; Future  -     HIV 1/2 AG/AB w Reflex [facility] for 2 yr old and above; Future  -     RPR-Syphilis Screening (Total Syphilis IGG/IGM); Future  -     Chlamydia/GC amplified DNA by PCR  -     Genital Comprehensive Culture  Encourage safe sexual practices; STI testing - C/G collected and STI labs ordered;    Acute vulvitis  -     clotrimazole-betamethasone (LOTRISONE) 1-0.05 % cream; Apply topically 2 (two) times a day  -     Chlamydia/GC amplified DNA by PCR  -     Genital Comprehensive Culture  Reviewed physical exam and currently uncontrolled Type 2 DM - suspicion of fungal infection vs lichen sclerosis. Will plan to trial lotrisone cream applied to the affected area BID x 14 days. I would like patient to RTO for follow-up appointment with MD to verify resolution vs if persists to have a vulvar biopsy done. Patient aware of risk of skin atrophy with chronic steroid cream use. We discussed importance of getting her sugars well controlled to prevent risk of fungal/yeast infection. Also discussed shaving not so close to skin may also help eliminate symptoms after shaving - can consider OTC hydrocortisone cream to manage symptoms after shaving as well.     PCOS (polycystic ovarian syndrome)  -     desogestrel-ethinyl estradiol (Isibloom) 0.15-30 MG-MCG per tablet; Take 1 tablet by mouth daily  Surveillance for birth control, oral contraceptives  -     desogestrel-ethinyl estradiol (Isibloom) 0.15-30 MG-MCG  per tablet; Take 1 tablet by mouth daily  Contraception - continue JAYJAY 1 tab po daily; reviewed how to take, missed pill protocol, birth control efficacy, back-up, STI protection.    Discussion  I have discussed the importance of monthly self-breast exams, exercise and healthy diet as well as adequate intake of calcium and vitamin D. Encourage MVI q day and r/elicia importance of folic acid; Encourage 30-40 min weight bearing exercise most days of week  The patient has had the Gardasil vaccine series, which is recommended for patients from 9-45 years of age.   All questions have been answered to her satisfaction  RTO for APE or sooner if needed    Subjective     HPI   Kimi Santiago is a 23 y.o. female who presents for annual well woman exam.   Menarche - 11; LMP - 3/12/24; Periods are usually reg q month and last 5-6 days; Last month it did come a week before her placebo pills which was odd for her but there was a lot of stress going on in her life so possible she might have missed pills or timing off, plus significant life stressors - did have a neg UPT; so far this pack - no bleeding and placebo pills to start on Sunday; No excessive bleeding; No intermenstrual bleeding or spotting; Cramps are tolerable.  Typically experiences itching/burning of vulva that starts a couple of days after shaving; gradually gets better but then recurs with shaving; Last office visit 4/2023 - treated for white plaque like lesions with temovate which seemed to resolve and haven't re-occurred since; No vaginal itch/burn; No abn discharge or odor; No urinary sx - burning/pain/frequency/hematuria; H/o insulin dependent type 2 DM - with all the stressors in life lately - hasn't been great about managing her sugars but is trying to get back on track.  (+) SBEs - no breast masses, asymmetry, nipple discharge or bleeding, changes in skin of breast, or breast tenderness bilaterally  No abd/pelvic pain or HAs;   Pt is sexually active in  a mutually monog sexual relationship x 5 months; No issues with intercourse; She ok to have sti/hiv/hep testing; Feels safe at home  Current contraception: JAYJAY  Gardasil - completed  (+) PCP for routine Bw/care;    Last Pap - 20 - WNL  History of abnormal Pap smear: no  Last STI screen - unknown    Review of Systems   Constitutional:  Negative for activity change, fatigue, fever and unexpected weight change.   HENT:  Negative for congestion, dental problem, sinus pressure and sinus pain.    Eyes:  Negative for visual disturbance.   Respiratory:  Negative for cough, shortness of breath and wheezing.    Cardiovascular:  Negative for chest pain and leg swelling.   Gastrointestinal:  Negative for abdominal distention, abdominal pain, blood in stool, constipation, diarrhea, nausea and vomiting.   Endocrine: Negative for polydipsia.   Genitourinary:  Positive for vaginal pain (vulvar itch/burn). Negative for difficulty urinating, dyspareunia, dysuria, frequency, hematuria, menstrual problem, pelvic pain, urgency, vaginal bleeding and vaginal discharge.   Musculoskeletal:  Negative for arthralgias and back pain.   Allergic/Immunologic: Negative for environmental allergies.   Neurological:  Negative for dizziness, seizures and headaches.   Psychiatric/Behavioral:  Negative for dysphoric mood and sleep disturbance. The patient is not nervous/anxious.        The following portions of the patient's history were reviewed and updated as appropriate: allergies, current medications, past family history, past medical history, past social history, past surgical history, and problem list.         OB History          0    Para   0    Term   0       0    AB   0    Living   0         SAB   0    IAB   0    Ectopic   0    Multiple   0    Live Births   0                 Past Medical History:   Diagnosis Date    Anxiety     Depression     Diabetes mellitus (HCC)     Type 2    PCOS (polycystic ovarian syndrome)      Penetrating foot wound     Right foot       Past Surgical History:   Procedure Laterality Date    WISDOM TOOTH EXTRACTION         Family History   Problem Relation Age of Onset    Diabetes Mother     No Known Problems Father     Diabetes Paternal Grandmother     Diabetes Paternal Grandfather     Anesthesia problems Neg Hx        Social History     Socioeconomic History    Marital status: Single     Spouse name: Not on file    Number of children: Not on file    Years of education: Not on file    Highest education level: Not on file   Occupational History    Not on file   Tobacco Use    Smoking status: Never    Smokeless tobacco: Never    Tobacco comments:     Denies any use per pt    Vaping Use    Vaping status: Never Used   Substance and Sexual Activity    Alcohol use: Yes     Comment: socially- couple times a month    Drug use: Yes     Types: Marijuana     Comment: every day    Sexual activity: Yes     Partners: Male     Birth control/protection: OCP   Other Topics Concern    Not on file   Social History Narrative    Not on file     Social Determinants of Health     Financial Resource Strain: Not on file   Food Insecurity: Not on file   Transportation Needs: Not on file   Physical Activity: Not on file   Stress: Not on file   Social Connections: Not on file   Intimate Partner Violence: Not on file   Housing Stability: Not on file         Current Outpatient Medications:     Alcohol Swabs (Alcohol Prep) 70 % PADS, Use 2 (two) times a day, Disp: 90 each, Rfl: 0    Blood Glucose Monitoring Suppl (OneTouch Verio Reflect) w/Device KIT, Use 1 Device 3 (three) times a day (Patient taking differently: Use 1 Device 3 (three) times a day 12/19/23 per pt tests once a day typically), Disp: 1 kit, Rfl: 0    citalopram (CeleXA) 10 mg tablet, TAKE 1 TABLET BY MOUTH EVERY DAY, Disp: 90 tablet, Rfl: 0    clobetasol (TEMOVATE) 0.05 % ointment, Apply topically 2 (two) times a day for 7 days (Patient taking differently: Apply topically  "2 (two) times a day 12/19/23 Per pt using as needed only), Disp: 15 g, Rfl: 0    clotrimazole-betamethasone (LOTRISONE) 1-0.05 % cream, Apply topically 2 (two) times a day, Disp: 45 g, Rfl: 0    desogestrel-ethinyl estradiol (Isibloom) 0.15-30 MG-MCG per tablet, Take 1 tablet by mouth daily, Disp: 84 tablet, Rfl: 3    dulaglutide (Trulicity) 1.5 MG/0.5ML injection, Inject 0.5 mL (1.5 mg total) under the skin every 7 days, Disp: 2 mL, Rfl: 3    glucose blood (OneTouch Verio) test strip, Use as instructed, Disp: 100 strip, Rfl: 5    Lancets (OneTouch Delica Plus [id number]) MISC, Use 1 each 3 (three) times a day, Disp: 100 each, Rfl: 5    metFORMIN (GLUCOPHAGE-XR) 500 mg 24 hr tablet, TAKE 2 TABLETS(1000 MG) BY MOUTH DAILY WITH DINNER, Disp: 180 tablet, Rfl: 0    triamcinolone (KENALOG) 0.1 % cream, Apply topically 2 (two) times a day (Patient taking differently: Apply topically 2 (two) times a day 12/19/23 Per pt using as needed), Disp: 30 g, Rfl: 0    cetirizine (ZyrTEC) 10 mg tablet, Take 1 tablet (10 mg total) by mouth daily (Patient not taking: Reported on 4/4/2024), Disp: 30 tablet, Rfl: 0    No Known Allergies    Objective   Vitals:    04/04/24 1143   BP: 124/82   BP Location: Left arm   Patient Position: Sitting   Cuff Size: Standard   Weight: 99.8 kg (220 lb)   Height: 5' 7\" (1.702 m)     Physical Exam  Vitals reviewed.   Constitutional:       General: She is awake. She is not in acute distress.     Appearance: Normal appearance. She is well-developed and well-groomed. She is not ill-appearing, toxic-appearing or diaphoretic.   HENT:      Head: Normocephalic and atraumatic.   Eyes:      Conjunctiva/sclera: Conjunctivae normal.   Neck:      Thyroid: No thyroid mass, thyromegaly or thyroid tenderness.   Cardiovascular:      Rate and Rhythm: Normal rate and regular rhythm.      Heart sounds: Normal heart sounds. No murmur heard.  Pulmonary:      Effort: Pulmonary effort is normal. No tachypnea, bradypnea or " respiratory distress.      Breath sounds: Normal breath sounds. No stridor or decreased air movement. No wheezing.   Chest:   Breasts:     Breasts are symmetrical.      Right: Normal. No swelling, bleeding, inverted nipple, mass, nipple discharge, skin change or tenderness.      Left: Normal. No swelling, bleeding, inverted nipple, mass, nipple discharge, skin change or tenderness.   Abdominal:      General: There is no distension.      Palpations: Abdomen is soft. There is no hepatomegaly, splenomegaly or mass.      Tenderness: There is no abdominal tenderness.      Hernia: No hernia is present. There is no hernia in the left inguinal area or right inguinal area.   Genitourinary:     General: Normal vulva.      Exam position: Supine.      Pubic Area: No rash or pubic lice.       Labia:         Right: Rash present. No tenderness, lesion or injury.         Left: Rash present. No tenderness, lesion or injury.       Urethra: No prolapse, urethral pain, urethral swelling or urethral lesion.      Vagina: No signs of injury and foreign body. Vaginal discharge (small amount of thick white chunky discharge) present. No erythema, tenderness, bleeding, lesions or prolapsed vaginal walls.      Cervix: No cervical motion tenderness, discharge, friability, lesion, erythema or cervical bleeding.      Uterus: Not deviated, not enlarged, not fixed, not tender and no uterine prolapse.       Adnexa:         Right: No mass, tenderness or fullness.          Left: No mass, tenderness or fullness.        Comments: (+) clearly demarcated erythema of vulva that spreads down perineum; raised border and plaque like in some areas; patient reports skin can crack  Lymphadenopathy:      Cervical: No cervical adenopathy.      Upper Body:      Right upper body: No supraclavicular or axillary adenopathy.      Left upper body: No supraclavicular or axillary adenopathy.      Lower Body: No right inguinal adenopathy. No left inguinal adenopathy.    Skin:     General: Skin is warm and dry.   Neurological:      Mental Status: She is alert and oriented to person, place, and time.   Psychiatric:         Mood and Affect: Mood and affect normal.         Speech: Speech normal.         Behavior: Behavior normal. Behavior is cooperative.         Thought Content: Thought content normal.         Judgment: Judgment normal.

## 2024-04-04 ENCOUNTER — ANNUAL EXAM (OUTPATIENT)
Dept: OBGYN CLINIC | Facility: CLINIC | Age: 24
End: 2024-04-04
Payer: COMMERCIAL

## 2024-04-04 VITALS
WEIGHT: 220 LBS | BODY MASS INDEX: 34.53 KG/M2 | DIASTOLIC BLOOD PRESSURE: 82 MMHG | SYSTOLIC BLOOD PRESSURE: 124 MMHG | HEIGHT: 67 IN

## 2024-04-04 DIAGNOSIS — Z01.411 ENCOUNTER FOR GYNECOLOGICAL EXAMINATION (GENERAL) (ROUTINE) WITH ABNORMAL FINDINGS: Primary | ICD-10-CM

## 2024-04-04 DIAGNOSIS — Z11.3 SCREENING FOR STD (SEXUALLY TRANSMITTED DISEASE): ICD-10-CM

## 2024-04-04 DIAGNOSIS — Z30.41 SURVEILLANCE FOR BIRTH CONTROL, ORAL CONTRACEPTIVES: ICD-10-CM

## 2024-04-04 DIAGNOSIS — E28.2 PCOS (POLYCYSTIC OVARIAN SYNDROME): ICD-10-CM

## 2024-04-04 DIAGNOSIS — N76.2 ACUTE VULVITIS: ICD-10-CM

## 2024-04-04 PROCEDURE — 99395 PREV VISIT EST AGE 18-39: CPT | Performed by: PHYSICIAN ASSISTANT

## 2024-04-04 RX ORDER — CLOTRIMAZOLE AND BETAMETHASONE DIPROPIONATE 10; .64 MG/G; MG/G
CREAM TOPICAL 2 TIMES DAILY
Qty: 45 G | Refills: 0 | Status: SHIPPED | OUTPATIENT
Start: 2024-04-04

## 2024-04-04 RX ORDER — DESOGESTREL AND ETHINYL ESTRADIOL 0.15-0.03
1 KIT ORAL DAILY
Qty: 84 TABLET | Refills: 3 | Status: SHIPPED | OUTPATIENT
Start: 2024-04-04

## 2024-04-04 NOTE — ASSESSMENT & PLAN NOTE
Contraception - continue JAYJAY 1 tab po daily; reviewed how to take, missed pill protocol, birth control efficacy, back-up, STI protection.

## 2024-04-05 ENCOUNTER — VBI (OUTPATIENT)
Dept: ADMINISTRATIVE | Facility: OTHER | Age: 24
End: 2024-04-05

## 2024-04-07 LAB
C TRACH RRNA SPEC QL NAA+PROBE: NOT DETECTED
N GONORRHOEA RRNA SPEC QL NAA+PROBE: NOT DETECTED

## 2024-04-09 LAB
C TRACH RRNA SPEC QL NAA+PROBE: NOT DETECTED
CLINICAL INFO: NORMAL
CYTO CVX: NORMAL
CYTOLOGY CMNT CVX/VAG CYTO-IMP: NORMAL
DATE PREVIOUS BX: NORMAL
LMP START DATE: NORMAL
N GONORRHOEA RRNA SPEC QL NAA+PROBE: NOT DETECTED
SL AMB PREV. PAP:: NORMAL
SPECIMEN SOURCE CVX/VAG CYTO: NORMAL

## 2024-05-24 ENCOUNTER — TELEPHONE (OUTPATIENT)
Age: 24
End: 2024-05-24

## 2024-05-24 NOTE — TELEPHONE ENCOUNTER
PT called because she needs us to fax over a full list of all of the medications that she is taking to her insurance company to begin coverage w/ them. Line disconnected before I could get the fax number.

## 2024-05-24 NOTE — TELEPHONE ENCOUNTER
Patient had called back to provide fax number     Fax Number: [phone]    Patient also stated on the subject line, when sending medication list over to the government assistance office, to include first and last name of the patient, and the case Number: [id number]    Please advise back to patient once this information has been faxed over.

## 2024-06-27 DIAGNOSIS — E11.65 TYPE 2 DIABETES MELLITUS WITH HYPERGLYCEMIA, WITHOUT LONG-TERM CURRENT USE OF INSULIN (HCC): ICD-10-CM

## 2024-06-27 RX ORDER — LANCETS 33 GAUGE
EACH MISCELLANEOUS
Qty: 100 EACH | Refills: 5 | Status: SHIPPED | OUTPATIENT
Start: 2024-06-27

## 2024-06-27 RX ORDER — BLOOD SUGAR DIAGNOSTIC
STRIP MISCELLANEOUS
Qty: 100 STRIP | Refills: 5 | Status: SHIPPED | OUTPATIENT
Start: 2024-06-27

## 2024-06-28 RX ORDER — GLUCOSAMINE HCL/CHONDROITIN SU 500-400 MG
CAPSULE ORAL 2 TIMES DAILY
Qty: 200 EACH | Refills: 1 | Status: SHIPPED | OUTPATIENT
Start: 2024-06-28

## 2024-10-11 ENCOUNTER — OFFICE VISIT (OUTPATIENT)
Dept: FAMILY MEDICINE CLINIC | Facility: CLINIC | Age: 24
End: 2024-10-11

## 2024-10-11 VITALS
TEMPERATURE: 96.6 F | SYSTOLIC BLOOD PRESSURE: 128 MMHG | OXYGEN SATURATION: 98 % | HEART RATE: 89 BPM | WEIGHT: 233.25 LBS | BODY MASS INDEX: 36.53 KG/M2 | DIASTOLIC BLOOD PRESSURE: 70 MMHG

## 2024-10-11 DIAGNOSIS — F32.A DEPRESSIVE DISORDER: ICD-10-CM

## 2024-10-11 DIAGNOSIS — Z00.00 ANNUAL PHYSICAL EXAM: Primary | ICD-10-CM

## 2024-10-11 DIAGNOSIS — E55.9 VITAMIN D DEFICIENCY: ICD-10-CM

## 2024-10-11 DIAGNOSIS — E11.65 TYPE 2 DIABETES MELLITUS WITH HYPERGLYCEMIA, WITHOUT LONG-TERM CURRENT USE OF INSULIN (HCC): ICD-10-CM

## 2024-10-11 DIAGNOSIS — L20.82 FLEXURAL ECZEMA: ICD-10-CM

## 2024-10-11 DIAGNOSIS — E28.2 PCOS (POLYCYSTIC OVARIAN SYNDROME): ICD-10-CM

## 2024-10-11 PROBLEM — M79.671 PAIN OF RIGHT HEEL: Status: RESOLVED | Noted: 2023-11-17 | Resolved: 2024-10-11

## 2024-10-11 PROBLEM — R23.4 CRACKED SKIN: Status: RESOLVED | Noted: 2023-05-02 | Resolved: 2024-10-11

## 2024-10-11 PROBLEM — S91.331A PUNCTURE WOUND OF RIGHT FOOT, INITIAL ENCOUNTER: Status: RESOLVED | Noted: 2023-11-17 | Resolved: 2024-10-11

## 2024-10-11 PROBLEM — Z01.818 PRE-OPERATIVE CLEARANCE: Status: RESOLVED | Noted: 2023-12-19 | Resolved: 2024-10-11

## 2024-10-11 PROCEDURE — 99395 PREV VISIT EST AGE 18-39: CPT | Performed by: FAMILY MEDICINE

## 2024-10-11 PROCEDURE — 99214 OFFICE O/P EST MOD 30 MIN: CPT | Performed by: FAMILY MEDICINE

## 2024-10-11 RX ORDER — CLOBETASOL PROPIONATE 0.5 MG/G
OINTMENT TOPICAL 2 TIMES DAILY
Qty: 15 G | Refills: 0 | Status: SHIPPED | OUTPATIENT
Start: 2024-10-11 | End: 2024-10-18

## 2024-10-11 NOTE — ASSESSMENT & PLAN NOTE
Continue OCP  Consider alternative GLP-1 agonist such as Ozempic or Mounjaro for added weight loss benefits in addition to control of DM, will continue this discussion pending labs at follow-up in 2 weeks

## 2024-10-11 NOTE — ASSESSMENT & PLAN NOTE
Lab Results   Component Value Date    HGBA1C 10.5 (H) 12/19/2023     Chronic, no recent labs  Current management on Trulicty and Metformin XR  DM eye exam utd - [facility] in [place] - awaiting records  DM foot exam completed today and normal  Refer to nutrition and DM education  Follow up in 2 weeks pending labs to discuss further mgmt options. I would like to switch to an alternative GLP-1 agonist such as Ozempic or Mounjaro for added weight loss benefits in the setting of obesity and PCOS.    Orders:    Hemoglobin A1C; Future    Comprehensive metabolic panel; Future    CBC and differential; Future    Albumin / creatinine urine ratio; Future    Lipid Panel with Direct LDL reflex; Future    TSH, 3rd generation with Free T4 reflex; Future    Ambulatory Referral to Diabetic Education; Future    Ambulatory Referral to Nutrition Services; Future

## 2024-10-11 NOTE — PROGRESS NOTES
Adult Annual Physical  Name: Kimi Santiago      : 2000      MRN: [id number]  Encounter Provider: Monica Sorto DO  Encounter Date: 10/11/2024   Encounter department: [facility]    Assessment & Plan  Annual physical exam         Type 2 diabetes mellitus with hyperglycemia, without long-term current use of insulin (HCC)    Lab Results   Component Value Date    HGBA1C 10.5 (H) 2023     Chronic, no recent labs  Current management on Trulicty and Metformin XR  [facility] [place] - awaiting records  DM foot exam completed today and normal  Refer to nutrition and DM education  Follow up in 2 weeks pending labs to discuss further mgmt options. I would like to switch to an alternative GLP-1 agonist such as Ozempic or Mounjaro for added weight loss benefits in the setting of obesity and PCOS.    Orders:    Hemoglobin A1C; Future    Comprehensive metabolic panel; Future    CBC and differential; Future    Albumin / creatinine urine ratio; Future    Lipid Panel with Direct LDL reflex; Future    TSH, 3rd generation with Free T4 reflex; Future    Ambulatory Referral to Diabetic Education; Future    Ambulatory Referral to Nutrition Services; Future    Flexural eczema  Chronic, continue daily moisturizer with prn steroid cream     Orders:    clobetasol (TEMOVATE) 0.05 % ointment; Apply topically 2 (two) times a day for 7 days    PCOS (polycystic ovarian syndrome)  Continue OCP  Consider alternative GLP-1 agonist such as Ozempic or Mounjaro for added weight loss benefits in addition to control of DM, will continue this discussion pending labs at follow-up in 2 weeks         Vitamin D deficiency  Chronic, due for labs      Orders:    Vitamin D 25 hydroxy; Future    Depressive disorder  Depression Screening Follow-up Plan: Patient's depression screening was positive with a PHQ-9 score of 7.  Increase celexa to 20mg daily. F/u 2 weeks          Immunizations  and preventive care screenings were discussed with patient today. Appropriate education was printed on patient's after visit summary.        Depression Screening and Follow-up Plan: Patient's depression screening was positive with a PHQ-9 score of 7. Patient assessed for underlying major depression. Brief counseling provided and recommend additional follow-up/re-evaluation next office visit.         History of Present Illness     No chief complaint on file.    PMH: DM, eczema, vitamin D deficiency, PCOS  SurgHx: widsdom teeth   Allergies: none  Medications:  SocialHx:   Tobacco: none   Alcohol: social    Relationship: josafat relationship, no children    Career:  at [employer]   Specialists: none  Labs: due    Adult Annual Physical:  Patient presents for annual physical.     Diet and Physical Activity:  - Diet/Nutrition: well balanced diet.  - Exercise: walking. walks to work and home daily    Depression Screening:    - PHQ-9 Score: 7    General Health:  - Sleep: 4-6 hours of sleep on average. affected by anxiety  - Hearing: normal hearing bilateral ears.  - Vision: goes for regular eye exams and wears glasses.  - Dental: no dental visits for > 1 year and brushes teeth once daily.    /GYN Health:  - Follows with GYN: yes.   - Menopause: premenopausal.   - History of STDs: no  - Contraception: oral contraceptives.      Review of Systems   Constitutional:  Negative for activity change, appetite change, chills, diaphoresis, fever and unexpected weight change.   HENT:  Negative for congestion, rhinorrhea, sore throat and trouble swallowing.    Eyes:  Negative for visual disturbance.   Respiratory:  Negative for cough, shortness of breath and wheezing.    Cardiovascular:  Negative for chest pain, palpitations and leg swelling.   Gastrointestinal:  Negative for abdominal pain, blood in stool, constipation and diarrhea.   Genitourinary:  Negative for difficulty urinating, dysuria, frequency and hematuria.    Musculoskeletal:  Negative for arthralgias, back pain and joint swelling.   Skin:  Negative for color change and rash.   Neurological:  Negative for dizziness, light-headedness and headaches.   Psychiatric/Behavioral:  Negative for dysphoric mood. The patient is not nervous/anxious.      Current Outpatient Medications on File Prior to Visit   Medication Sig Dispense Refill    Alcohol Swabs 70 % PADS USE TWICE DAILY 200 each 1    citalopram (CeleXA) 10 mg tablet TAKE 1 TABLET BY MOUTH EVERY DAY 90 tablet 0    desogestrel-ethinyl estradiol (Isibloom) 0.15-30 MG-MCG per tablet Take 1 tablet by mouth daily 84 tablet 3    dulaglutide (Trulicity) 1.5 MG/0.5ML injection Inject 0.5 mL (1.5 mg total) under the skin every 7 days 2 mL 3    glucose blood (OneTouch Verio) test strip TEST 3 TIMES A DAY AS DIRECTED 100 strip 5    Lancets (OneTouch Delica Plus [id number]) MISC USE 1 LANCET 3 TIMES A  each 5    triamcinolone (KENALOG) 0.1 % cream Apply topically 2 (two) times a day (Patient taking differently: Apply topically 2 (two) times a day 12/19/23 Per pt using as needed) 30 g 0    [DISCONTINUED] Blood Glucose Monitoring Suppl (OneTouch Verio Reflect) w/Device KIT Use 1 Device 3 (three) times a day (Patient taking differently: Use 1 Device 3 (three) times a day 12/19/23 per pt tests once a day typically) 1 kit 0    [DISCONTINUED] cetirizine (ZyrTEC) 10 mg tablet Take 1 tablet (10 mg total) by mouth daily (Patient not taking: Reported on 4/4/2024) 30 tablet 0    [DISCONTINUED] clobetasol (TEMOVATE) 0.05 % ointment Apply topically 2 (two) times a day for 7 days (Patient taking differently: Apply topically 2 (two) times a day 12/19/23 Per pt using as needed only) 15 g 0    [DISCONTINUED] clotrimazole-betamethasone (LOTRISONE) 1-0.05 % cream Apply topically 2 (two) times a day 45 g 0    [DISCONTINUED] metFORMIN (GLUCOPHAGE-XR) 500 mg 24 hr tablet TAKE 2 TABLETS(1000 MG) BY MOUTH DAILY WITH DINNER 180 tablet 0     No  current facility-administered medications on file prior to visit.      Social History     Tobacco Use    Smoking status: Never    Smokeless tobacco: Never    Tobacco comments:     Denies any use per pt    Vaping Use    Vaping status: Never Used   Substance and Sexual Activity    Alcohol use: Yes     Comment: socially- couple times a month    Drug use: Yes     Types: Marijuana     Comment: every day    Sexual activity: Yes     Partners: Male     Birth control/protection: OCP       Objective     /70 (BP Location: Right arm, Patient Position: Sitting, Cuff Size: Large)   Pulse 89   Temp (!) 96.6 °F (35.9 °C) (Tympanic)   Wt 106 kg (233 lb 4 oz)   SpO2 98%   BMI 36.53 kg/m²     Physical Exam  Vitals reviewed.   Constitutional:       Appearance: Normal appearance. She is obese.   HENT:      Head: Normocephalic and atraumatic.      Right Ear: Tympanic membrane, ear canal and external ear normal. There is no impacted cerumen.      Left Ear: Tympanic membrane, ear canal and external ear normal. There is no impacted cerumen.      Nose: Nose normal. No congestion.      Mouth/Throat:      Mouth: Mucous membranes are moist.      Pharynx: Oropharynx is clear. No oropharyngeal exudate.   Eyes:      Extraocular Movements: Extraocular movements intact.      Conjunctiva/sclera: Conjunctivae normal.   Neck:      Thyroid: No thyroid mass or thyromegaly.   Cardiovascular:      Rate and Rhythm: Normal rate and regular rhythm.      Pulses: Normal pulses. no weak pulses.           Dorsalis pedis pulses are 2+ on the right side and 2+ on the left side.        Posterior tibial pulses are 2+ on the right side and 2+ on the left side.      Heart sounds: Normal heart sounds. No murmur heard.  Pulmonary:      Effort: Pulmonary effort is normal. No respiratory distress.      Breath sounds: Normal breath sounds. No wheezing.   Abdominal:      General: Abdomen is flat. Bowel sounds are normal. There is no distension.      Palpations:  Abdomen is soft.      Tenderness: There is no abdominal tenderness.   Musculoskeletal:      Cervical back: Normal range of motion and neck supple.      Right lower leg: No edema.      Left lower leg: No edema.   Feet:      Right foot:      Skin integrity: No ulcer, skin breakdown, erythema, warmth, callus or dry skin.      Left foot:      Skin integrity: No ulcer, skin breakdown, erythema, warmth, callus or dry skin.   Skin:     General: Skin is warm and dry.   Neurological:      General: No focal deficit present.      Mental Status: She is alert.   Psychiatric:         Mood and Affect: Mood normal.         Behavior: Behavior normal.       Patient's shoes and socks removed.    Right Foot/Ankle   Right Foot Inspection  Skin Exam: skin normal and skin intact. No dry skin, no warmth, no callus, no erythema, no maceration, no abnormal color, no pre-ulcer, no ulcer and no callus.     Toe Exam: ROM and strength within normal limits.     Sensory   Monofilament testing: intact    Vascular  Capillary refills: < 3 seconds  The right DP pulse is 2+. The right PT pulse is 2+.     Left Foot/Ankle  Left Foot Inspection  Skin Exam: skin normal and skin intact. No dry skin, no warmth, no erythema, no maceration, normal color, no pre-ulcer, no ulcer and no callus.     Toe Exam: ROM and strength within normal limits.     Sensory   Monofilament testing: intact    Vascular  Capillary refills: < 3 seconds  The left DP pulse is 2+. The left PT pulse is 2+.     Assign Risk Category  No deformity present  No loss of protective sensation  No weak pulses  Risk: 0

## 2024-10-11 NOTE — ASSESSMENT & PLAN NOTE
Depression Screening Follow-up Plan: Patient's depression screening was positive with a PHQ-9 score of 7. Increase celexa to 20mg daily. F/u 2 weeks

## 2024-10-11 NOTE — ASSESSMENT & PLAN NOTE
Chronic, continue daily moisturizer with prn steroid cream     Orders:    clobetasol (TEMOVATE) 0.05 % ointment; Apply topically 2 (two) times a day for 7 days

## 2024-10-16 DIAGNOSIS — F41.1 GAD (GENERALIZED ANXIETY DISORDER): ICD-10-CM

## 2024-10-17 RX ORDER — CITALOPRAM HYDROBROMIDE 10 MG/1
10 TABLET ORAL DAILY
Qty: 90 TABLET | Refills: 0 | Status: SHIPPED | OUTPATIENT
Start: 2024-10-17

## 2025-01-30 ENCOUNTER — TELEPHONE (OUTPATIENT)
Dept: INTERNAL MEDICINE CLINIC | Facility: CLINIC | Age: 25
End: 2025-01-30

## 2025-01-30 NOTE — TELEPHONE ENCOUNTER
Hi, patient has not been seen in our office since 2023, recently is being seen at [facility]. Please remove patient from our office status. Thank you.

## 2025-07-28 ENCOUNTER — TELEPHONE (OUTPATIENT)
Dept: INTERNAL MEDICINE CLINIC | Facility: CLINIC | Age: 25
End: 2025-07-28

## 2025-07-29 ENCOUNTER — DOCUMENTATION (OUTPATIENT)
Dept: ADMINISTRATIVE | Facility: OTHER | Age: 25
End: 2025-07-29